# Patient Record
Sex: MALE | Race: WHITE | NOT HISPANIC OR LATINO | Employment: FULL TIME | URBAN - METROPOLITAN AREA
[De-identification: names, ages, dates, MRNs, and addresses within clinical notes are randomized per-mention and may not be internally consistent; named-entity substitution may affect disease eponyms.]

---

## 2017-07-25 ENCOUNTER — ALLSCRIPTS OFFICE VISIT (OUTPATIENT)
Dept: OTHER | Facility: OTHER | Age: 47
End: 2017-07-25

## 2017-07-25 LAB
BILIRUB UR QL STRIP: NEGATIVE
CLARITY UR: NORMAL
COLOR UR: YELLOW
GLUCOSE (HISTORICAL): 500
HGB UR QL STRIP.AUTO: NEGATIVE
KETONES UR STRIP-MCNC: NEGATIVE MG/DL
LEUKOCYTE ESTERASE UR QL STRIP: NEGATIVE
NITRITE UR QL STRIP: NEGATIVE
PH UR STRIP.AUTO: 6.5 [PH]
PROT UR STRIP-MCNC: NORMAL MG/DL
SP GR UR STRIP.AUTO: 1.01
UROBILINOGEN UR QL STRIP.AUTO: 0.2

## 2017-11-25 DIAGNOSIS — N40.0 ENLARGED PROSTATE WITHOUT LOWER URINARY TRACT SYMPTOMS (LUTS): ICD-10-CM

## 2017-11-28 ENCOUNTER — ALLSCRIPTS OFFICE VISIT (OUTPATIENT)
Dept: OTHER | Facility: OTHER | Age: 47
End: 2017-11-28

## 2017-11-28 LAB
BILIRUB UR QL STRIP: NEGATIVE
CLARITY UR: NORMAL
COLOR UR: YELLOW
GLUCOSE (HISTORICAL): NORMAL
HGB UR QL STRIP.AUTO: NEGATIVE
KETONES UR STRIP-MCNC: NEGATIVE MG/DL
LEUKOCYTE ESTERASE UR QL STRIP: NEGATIVE
NITRITE UR QL STRIP: NEGATIVE
PH UR STRIP.AUTO: 6.5 [PH]
PROT UR STRIP-MCNC: NEGATIVE MG/DL
SP GR UR STRIP.AUTO: 1.01
UROBILINOGEN UR QL STRIP.AUTO: 0.2

## 2018-01-12 NOTE — MISCELLANEOUS
Message  Return to work or school:   Neetu Daly is under my professional care   He was seen in my office on 11/28/17   He is able to return to work on  11/30/17            Signatures   Electronically signed by : Vasiliy Seals MD; Nov 28 2017  4:47PM EST

## 2018-01-14 VITALS
BODY MASS INDEX: 36.51 KG/M2 | WEIGHT: 255 LBS | DIASTOLIC BLOOD PRESSURE: 82 MMHG | HEIGHT: 70 IN | SYSTOLIC BLOOD PRESSURE: 136 MMHG

## 2018-01-22 VITALS
DIASTOLIC BLOOD PRESSURE: 84 MMHG | HEIGHT: 70 IN | SYSTOLIC BLOOD PRESSURE: 136 MMHG | WEIGHT: 251 LBS | BODY MASS INDEX: 35.93 KG/M2

## 2018-03-21 ENCOUNTER — TELEPHONE (OUTPATIENT)
Dept: UROLOGY | Facility: MEDICAL CENTER | Age: 48
End: 2018-03-21

## 2018-03-28 ENCOUNTER — DOCUMENTATION (OUTPATIENT)
Dept: UROLOGY | Facility: MEDICAL CENTER | Age: 48
End: 2018-03-28

## 2018-03-28 ENCOUNTER — OFFICE VISIT (OUTPATIENT)
Dept: UROLOGY | Facility: MEDICAL CENTER | Age: 48
End: 2018-03-28
Payer: COMMERCIAL

## 2018-03-28 VITALS
HEIGHT: 71 IN | WEIGHT: 250 LBS | DIASTOLIC BLOOD PRESSURE: 84 MMHG | SYSTOLIC BLOOD PRESSURE: 132 MMHG | BODY MASS INDEX: 35 KG/M2

## 2018-03-28 DIAGNOSIS — E29.1 TESTICULAR HYPOGONADISM: Primary | ICD-10-CM

## 2018-03-28 LAB
SL AMB  POCT GLUCOSE, UA: NORMAL
SL AMB LEUKOCYTE ESTERASE,UA: NEGATIVE
SL AMB POCT BILIRUBIN,UA: NEGATIVE
SL AMB POCT BLOOD,UA: NEGATIVE
SL AMB POCT CLARITY,UA: CLEAR
SL AMB POCT COLOR,UA: YELLOW
SL AMB POCT KETONES,UA: NEGATIVE
SL AMB POCT NITRITE,UA: NEGATIVE
SL AMB POCT PH,UA: 6
SL AMB POCT SPECIFIC GRAVITY,UA: 1.01
SL AMB POCT URINE PROTEIN: NEGATIVE
SL AMB POCT UROBILINOGEN: 0.2

## 2018-03-28 PROCEDURE — S0189 TESTOSTERONE PELLET 75 MG: HCPCS | Performed by: UROLOGY

## 2018-03-28 PROCEDURE — 81003 URINALYSIS AUTO W/O SCOPE: CPT | Performed by: UROLOGY

## 2018-03-28 PROCEDURE — 11980 IMPLANT HORMONE PELLET(S): CPT | Performed by: UROLOGY

## 2018-03-28 RX ORDER — SIMVASTATIN 10 MG
10 TABLET ORAL DAILY
COMMUNITY

## 2018-03-28 RX ORDER — DULAGLUTIDE 0.75 MG/.5ML
INJECTION, SOLUTION SUBCUTANEOUS WEEKLY
COMMUNITY
Start: 2018-03-06 | End: 2022-01-26 | Stop reason: ALTCHOICE

## 2018-03-28 RX ORDER — SILDENAFIL 100 MG/1
TABLET, FILM COATED ORAL AS NEEDED
COMMUNITY

## 2018-03-28 NOTE — PROGRESS NOTES
Procedures       Preoperative diagnosis:  Testicular hypogonadism    Postoperative diagnosis:  Same    Operation:  Testopel implant    Surgeon:  Asaf Howell MD, if ACS    Anesthesia:  Xylocaine 1%, 12 mL    Procedure: The position was positioned on the procedure table with his left side down  His right buttock was then prepped and draped in sterile fashion  A total of 12 mL of 1% xylocaine was infiltrated at the Ernesto insertion site and along 3 subcutaneous tracts to receive the pellets  A small skin incision was made  The subcutaneous tracts were created in each was filled with 4 pellets  Following implantation, dry sterile dressing was applied  The procedure was completed  The patient tolerated well and was sent home in satisfactory condition    Blood loss was minimal

## 2018-03-28 NOTE — LETTER
March 28, 2018     Teagan Stover MD  [facility]  [address]  [facility] [place] [facility] [address]    Patient: Albert Zamora   YOB: 1970   Date of Visit: 3/28/2018       Dear Dr Kirill Pena: Thank you for referring Albert Zamora to me for evaluation  Below are my notes for this consultation  If you have questions, please do not hesitate to call me  I look forward to following your patient along with you  Sincerely,        Teagan Stover MD        CC: DO Teagan Wong MD  3/28/2018 10:04 AM  Sign at close encounter  Procedures       Preoperative diagnosis:  Testicular hypogonadism    Postoperative diagnosis:  Same    Operation:  Testopel implant    Surgeon:  Rylie Crocker MD, if ACS    Anesthesia:  Xylocaine 1%, 12 mL    Procedure: The position was positioned on the procedure table with his left side down  His right buttock was then prepped and draped in sterile fashion  A total of 12 mL of 1% xylocaine was infiltrated at the Ernesto insertion site and along 3 subcutaneous tracts to receive the pellets  A small skin incision was made  The subcutaneous tracts were created in each was filled with 4 pellets  Following implantation, dry sterile dressing was applied  The procedure was completed  The patient tolerated well and was sent home in satisfactory condition    Blood loss was minimal

## 2018-06-22 ENCOUNTER — TELEPHONE (OUTPATIENT)
Dept: UROLOGY | Facility: MEDICAL CENTER | Age: 48
End: 2018-06-22

## 2018-06-22 NOTE — TELEPHONE ENCOUNTER
7/30/2018 Testopel insertion with Dr Connor Avelar      -covered benefit 100% once deductible is    Met    -$221 00 remaining deductible     -[facility] delivered Testopel to office 7/27/18      -Per Clear coverage: No Auth needed

## 2018-07-30 ENCOUNTER — PROCEDURE VISIT (OUTPATIENT)
Dept: UROLOGY | Facility: MEDICAL CENTER | Age: 48
End: 2018-07-30
Payer: COMMERCIAL

## 2018-07-30 VITALS
DIASTOLIC BLOOD PRESSURE: 82 MMHG | HEIGHT: 71 IN | BODY MASS INDEX: 35.28 KG/M2 | WEIGHT: 252 LBS | SYSTOLIC BLOOD PRESSURE: 134 MMHG

## 2018-07-30 DIAGNOSIS — R35.1 NOCTURIA: ICD-10-CM

## 2018-07-30 DIAGNOSIS — E29.1 TESTICULAR HYPOFUNCTION: Primary | ICD-10-CM

## 2018-07-30 LAB
SL AMB  POCT GLUCOSE, UA: NORMAL
SL AMB LEUKOCYTE ESTERASE,UA: NEGATIVE
SL AMB POCT BILIRUBIN,UA: NEGATIVE
SL AMB POCT BLOOD,UA: NEGATIVE
SL AMB POCT CLARITY,UA: CLEAR
SL AMB POCT COLOR,UA: YELLOW
SL AMB POCT KETONES,UA: NEGATIVE
SL AMB POCT NITRITE,UA: NEGATIVE
SL AMB POCT PH,UA: 6.5
SL AMB POCT SPECIFIC GRAVITY,UA: 1.01
SL AMB POCT URINE PROTEIN: NEGATIVE
SL AMB POCT UROBILINOGEN: 0.2

## 2018-07-30 PROCEDURE — 81003 URINALYSIS AUTO W/O SCOPE: CPT | Performed by: UROLOGY

## 2018-07-30 PROCEDURE — 11980 IMPLANT HORMONE PELLET(S): CPT | Performed by: UROLOGY

## 2018-07-30 NOTE — PROGRESS NOTES
IPSS Questionnaire (AUA-7): Over the past month    1)  How often have you had a sensation of not emptying your bladder completely after you finish urinating? 0 - Not at all   2)  How often have you had to urinate again less than two hours after you finished urinating? 1 - Less than 1 time in 5   3)  How often have you found you stopped and started again several times when you urinated? 0 - Not at all   4) How difficult have you found it to postpone urination? 1 - Less than 1 time in 5   5) How often have you had a weak urinary stream?  0 - Not at all   6) How often have you had to push or strain to begin urination? 0 - Not at all   7) How many times did you most typically get up to urinate from the time you went to bed until the time you got up in the morning?  0 - None   Total Score:  2     QOL: Delighted

## 2018-07-30 NOTE — PROGRESS NOTES
Procedures      Preoperative diagnosis:  Testicular hypogonadism    Postoperative diagnosis: Same    Operation:  Testopel implant    Surgeon:  Waqas Alvarado MD, if ACS    Anesthesia:  Xylocaine 1%, 12 mL    Procedure: The position was positioned on the procedure table with his right side down  His left buttock was then prepped and draped in sterile fashion  A total of 12 mL of 1% xylocaine was infiltrated at the Ernesto insertion site and along 3 subcutaneous tracts to receive the pellets  A small skin incision was made  The subcutaneous tracts were created in each was filled with 12 pellets  Following implantation, a dry sterile dressing was applied  The procedure was completed  The patient tolerated well and was sent home in satisfactory condition    Blood loss was minimal

## 2018-10-29 ENCOUNTER — TELEPHONE (OUTPATIENT)
Dept: UROLOGY | Facility: MEDICAL CENTER | Age: 48
End: 2018-10-29

## 2018-10-29 NOTE — TELEPHONE ENCOUNTER
-covered benefit 100% once deductible is Met     -Deductible met for 2018     -[facility] order form faxed 10/29/18              Pellets to be delivered to the office 11/29/18 from  [facility]     -Per Clear coverage: No Auth needed

## 2018-12-04 ENCOUNTER — PROCEDURE VISIT (OUTPATIENT)
Dept: UROLOGY | Facility: MEDICAL CENTER | Age: 48
End: 2018-12-04
Payer: COMMERCIAL

## 2018-12-04 VITALS
SYSTOLIC BLOOD PRESSURE: 120 MMHG | HEIGHT: 70 IN | DIASTOLIC BLOOD PRESSURE: 84 MMHG | HEART RATE: 84 BPM | WEIGHT: 250 LBS | BODY MASS INDEX: 35.79 KG/M2

## 2018-12-04 DIAGNOSIS — E29.1 TESTICULAR HYPOFUNCTION: Primary | ICD-10-CM

## 2018-12-04 PROCEDURE — 11980 IMPLANT HORMONE PELLET(S): CPT | Performed by: UROLOGY

## 2018-12-04 PROCEDURE — S0189 TESTOSTERONE PELLET 75 MG: HCPCS

## 2018-12-04 NOTE — PROGRESS NOTES
Procedures      Preoperative diagnosis:  Testicular hypogonadism    Postoperative diagnosis: Same    Operation:  Testopel implant    Surgeon:  Rea Boone MD, if ACS    Anesthesia:  Xylocaine 1%  With epinephrine, 12 mL    Procedure: The position was positioned on the procedure table with his  left side down  His  right buttock was then prepped and draped in sterile fashion  A total of 12 mL of 1% xylocaine was infiltrated at the pellet insertion site and along  3 subcutaneous tracts to receive the pellets  A small skin incision was made  The subcutaneous tracts were created in each was filled with  4 pellets  Following implantation, a dry sterile dressing was applied  The procedure was completed  The patient tolerated well and was sent home in satisfactory condition  Blood loss was minimal     Testosterone levels on October 27, 2018 when the middle of the normal range  PSA is 0 6 which is normal for man this age  AUA SYMPTOM SCORE      Most Recent Value   AUA SYMPTOM SCORE   How often have you had a sensation of not emptying your bladder completely after you finished urinating? 0   How often have you had to urinate again less than two hours after you finished urinating? 1   How often have you found you stopped and started again several times when you urinate?  0   How often have you found it difficult to postpone urination? 2   How often have you had a weak urinary stream?  0   How often have you had to push or strain to begin urination? 0   How many times did you most typically get up to urinate from the time you went to bed at night until the time you got up in the morning?   1   Quality of Life: If you were to spend the rest of your life with your urinary condition just the way it is now, how would you feel about that?  1   AUA SYMPTOM SCORE  4

## 2018-12-04 NOTE — LETTER
December 4, 2018     Jevon Montesmaciel, [address] [facility] [address]    Patient: Lashay Cote   YOB: 1970   Date of Visit: 12/4/2018       Dear Dr Crescencio Yoon:    Thank you for referring Lashay Cote to me for evaluation  Below are my notes for this consultation  If you have questions, please do not hesitate to call me  I look forward to following your patient along with you  Sincerely,        Genoveva Staton MD        CC: No Recipients  Genoveva Staton MD  12/4/2018  9:42 AM  Sign at close encounter  Procedures      Preoperative diagnosis:  Testicular hypogonadism    Postoperative diagnosis: Same    Operation:  Testopel implant    Surgeon:  Rea Boone MD, if ACS    Anesthesia:  Xylocaine 1%  With epinephrine, 12 mL    Procedure: The position was positioned on the procedure table with his  left side down  His  right buttock was then prepped and draped in sterile fashion  A total of 12 mL of 1% xylocaine was infiltrated at the pellet insertion site and along  3 subcutaneous tracts to receive the pellets  A small skin incision was made  The subcutaneous tracts were created in each was filled with  4 pellets  Following implantation, a dry sterile dressing was applied  The procedure was completed  The patient tolerated well and was sent home in satisfactory condition  Blood loss was minimal     Testosterone levels on October 27, 2018 when the middle of the normal range  PSA is 0 6 which is normal for man this age  AUA SYMPTOM SCORE      Most Recent Value   AUA SYMPTOM SCORE   How often have you had a sensation of not emptying your bladder completely after you finished urinating? 0   How often have you had to urinate again less than two hours after you finished urinating? 1   How often have you found you stopped and started again several times when you urinate?  0   How often have you found it difficult to postpone urination?   2   How often have you had a weak urinary stream?  0   How often have you had to push or strain to begin urination? 0   How many times did you most typically get up to urinate from the time you went to bed at night until the time you got up in the morning?   1   Quality of Life: If you were to spend the rest of your life with your urinary condition just the way it is now, how would you feel about that?  1   AUA SYMPTOM SCORE  4

## 2019-02-08 ENCOUNTER — TELEPHONE (OUTPATIENT)
Dept: UROLOGY | Facility: MEDICAL CENTER | Age: 49
End: 2019-02-08

## 2019-04-09 ENCOUNTER — PROCEDURE VISIT (OUTPATIENT)
Dept: UROLOGY | Facility: MEDICAL CENTER | Age: 49
End: 2019-04-09
Payer: COMMERCIAL

## 2019-04-09 VITALS
DIASTOLIC BLOOD PRESSURE: 84 MMHG | WEIGHT: 245 LBS | HEIGHT: 70 IN | HEART RATE: 93 BPM | SYSTOLIC BLOOD PRESSURE: 142 MMHG | BODY MASS INDEX: 35.07 KG/M2

## 2019-04-09 DIAGNOSIS — E29.1 TESTICULAR HYPOFUNCTION: Primary | ICD-10-CM

## 2019-04-09 PROCEDURE — 81003 URINALYSIS AUTO W/O SCOPE: CPT | Performed by: UROLOGY

## 2019-04-09 PROCEDURE — 11980 IMPLANT HORMONE PELLET(S): CPT | Performed by: UROLOGY

## 2019-04-09 RX ORDER — PREDNISONE 20 MG/1
TABLET ORAL
COMMUNITY
End: 2019-04-09

## 2019-04-09 RX ORDER — FLUNISOLIDE 0.25 MG/ML
SOLUTION NASAL
COMMUNITY
End: 2019-04-09

## 2019-04-09 RX ORDER — PREDNISOLONE ACETATE 10 MG/ML
SUSPENSION/ DROPS OPHTHALMIC
COMMUNITY
End: 2019-04-09

## 2019-04-09 RX ORDER — OXYCODONE HYDROCHLORIDE AND ACETAMINOPHEN 5; 325 MG/1; MG/1
TABLET ORAL
COMMUNITY
End: 2019-04-09

## 2019-04-09 RX ORDER — NABUMETONE 750 MG/1
TABLET, FILM COATED ORAL
COMMUNITY
End: 2019-04-09

## 2019-04-09 RX ORDER — AMOXICILLIN AND CLAVULANATE POTASSIUM 875; 125 MG/1; MG/1
TABLET, FILM COATED ORAL
COMMUNITY
End: 2019-04-09

## 2019-04-09 RX ORDER — CIPROFLOXACIN HYDROCHLORIDE 3.5 MG/ML
SOLUTION/ DROPS TOPICAL
COMMUNITY
End: 2019-04-09

## 2019-04-09 RX ORDER — KETOROLAC TROMETHAMINE 5 MG/ML
SOLUTION OPHTHALMIC
COMMUNITY
End: 2019-04-09

## 2019-08-01 ENCOUNTER — TELEPHONE (OUTPATIENT)
Dept: UROLOGY | Facility: AMBULATORY SURGERY CENTER | Age: 49
End: 2019-08-01

## 2019-08-02 ENCOUNTER — TELEPHONE (OUTPATIENT)
Dept: UROLOGY | Facility: MEDICAL CENTER | Age: 49
End: 2019-08-02

## 2019-08-02 NOTE — TELEPHONE ENCOUNTER
Vianney from [facility] called to see if the patient is taking the testosterone on its own or with another androgen

## 2019-08-02 NOTE — TELEPHONE ENCOUNTER
Rubi Rushing from [address] [place] [address] called to schedule a delivery of this patient's Testopel  Return the call to [phone]

## 2019-08-13 ENCOUNTER — PROCEDURE VISIT (OUTPATIENT)
Dept: UROLOGY | Facility: MEDICAL CENTER | Age: 49
End: 2019-08-13
Payer: COMMERCIAL

## 2019-08-13 VITALS
HEART RATE: 88 BPM | DIASTOLIC BLOOD PRESSURE: 82 MMHG | BODY MASS INDEX: 33.6 KG/M2 | SYSTOLIC BLOOD PRESSURE: 130 MMHG | HEIGHT: 71 IN | WEIGHT: 240 LBS

## 2019-08-13 DIAGNOSIS — E29.1 TESTICULAR HYPOFUNCTION: Primary | ICD-10-CM

## 2019-08-13 DIAGNOSIS — R35.1 NOCTURIA: ICD-10-CM

## 2019-08-13 PROCEDURE — 11980 IMPLANT HORMONE PELLET(S): CPT | Performed by: UROLOGY

## 2019-08-13 NOTE — PROGRESS NOTES
Procedures  Preoperative diagnosis:  Testicular hypogonadism    Postoperative diagnosis: Same    Operation:  Testopel implant    Surgeon:  Castro Jamison MD    Anesthesia:  Xylocaine 1%, 12 mL    Procedure: The patient was positioned on the procedure table with his left side down  His right buttock was then prepped and draped in sterile fashion  A total of 12 mL of 1% xylocaine was infiltrated at the pellet insertion site and along 3 subcutaneous tracts to receive the pellets  A small skin incision was made  The subcutaneous tracts were created in each was filled with 4 pellets  Following implantation, a dry sterile dressing was applied  The procedure was completed  The patient tolerated well and was sent home in satisfactory condition    Blood loss was minimal

## 2019-11-05 ENCOUNTER — TELEPHONE (OUTPATIENT)
Dept: UROLOGY | Facility: MEDICAL CENTER | Age: 49
End: 2019-11-05

## 2019-11-20 NOTE — TELEPHONE ENCOUNTER
No updates from [place] as of today's date  Called for update, spoke to [facility], he states rx is still in insurance verification  I advised we need pellets by 12/1  He states he will have this expedited and contact me as soon as he can schedule delivery

## 2019-11-25 NOTE — TELEPHONE ENCOUNTER
No updates from [facility] as of today's date  Called, spoke to Edwardo Castillo  He states medication is verified by insurance and is ready to be shipped  Scheduled delivery 11/27

## 2019-12-10 ENCOUNTER — PROCEDURE VISIT (OUTPATIENT)
Dept: UROLOGY | Facility: MEDICAL CENTER | Age: 49
End: 2019-12-10
Payer: COMMERCIAL

## 2019-12-10 VITALS
SYSTOLIC BLOOD PRESSURE: 124 MMHG | WEIGHT: 240 LBS | DIASTOLIC BLOOD PRESSURE: 72 MMHG | HEART RATE: 89 BPM | BODY MASS INDEX: 33.6 KG/M2 | HEIGHT: 71 IN

## 2019-12-10 DIAGNOSIS — R35.1 NOCTURIA: ICD-10-CM

## 2019-12-10 DIAGNOSIS — E29.1 TESTICULAR HYPOFUNCTION: Primary | ICD-10-CM

## 2019-12-10 PROCEDURE — 11980 IMPLANT HORMONE PELLET(S): CPT | Performed by: UROLOGY

## 2019-12-10 PROCEDURE — S0189 TESTOSTERONE PELLET 75 MG: HCPCS | Performed by: UROLOGY

## 2019-12-10 NOTE — PROGRESS NOTES
Procedures  Preoperative diagnosis:  Testicular hypogonadism    Postoperative diagnosis: Same    Operation:  Testopel implant    Surgeon:  Mihaela Jessica MD    Anesthesia:  Xylocaine 1%, 12 mL    Procedure: The patient was positioned on the procedure table with his right side down  His left buttock was then prepped and draped in sterile fashion  A total of 12 mL of 1% xylocaine was infiltrated at the pellet insertion site and along 3 subcutaneous tracts to receive the pellets  A small skin incision was made  The subcutaneous tracts were created in each was filled with 4 pellets  Following implantation, a dry sterile dressing was applied  The procedure was completed  The patient tolerated well and was sent home in satisfactory condition  Blood loss was minimal     Testosterone levels have been ordered prior to the next implantation  I have also ordered a PSA since the patient will be over 48years old at that visit

## 2020-03-09 ENCOUNTER — TELEPHONE (OUTPATIENT)
Dept: UROLOGY | Facility: MEDICAL CENTER | Age: 50
End: 2020-03-09

## 2020-03-09 NOTE — TELEPHONE ENCOUNTER
Patient of Dr Gale Coto  Patient needs prior authorization for his testopel treatments due to insurance change  Patient has appointment in April  Patient requesting call back    Patient states he has [address]

## 2020-03-11 NOTE — TELEPHONE ENCOUNTER
Patient has CBC which we have a prior auth on file for Testopel which expires in 7/2020, he can be buy and bill  He was wondering regarding this because he received something in the mail about ordering through [facility] which he has in the past  I did explain to him he can order through there again or he can get it at our office  He is ok with our office supplying this medication    Thanks  Daisy Blankenship

## 2020-05-05 ENCOUNTER — TELEPHONE (OUTPATIENT)
Dept: UROLOGY | Facility: MEDICAL CENTER | Age: 50
End: 2020-05-05

## 2020-05-12 ENCOUNTER — PROCEDURE VISIT (OUTPATIENT)
Dept: UROLOGY | Facility: MEDICAL CENTER | Age: 50
End: 2020-05-12
Payer: COMMERCIAL

## 2020-05-12 VITALS
SYSTOLIC BLOOD PRESSURE: 128 MMHG | WEIGHT: 240 LBS | HEIGHT: 71 IN | DIASTOLIC BLOOD PRESSURE: 72 MMHG | BODY MASS INDEX: 33.6 KG/M2

## 2020-05-12 DIAGNOSIS — E29.1 TESTICULAR HYPOFUNCTION: Primary | ICD-10-CM

## 2020-05-12 PROCEDURE — S0189 TESTOSTERONE PELLET 75 MG: HCPCS | Performed by: UROLOGY

## 2020-05-12 PROCEDURE — 11980 IMPLANT HORMONE PELLET(S): CPT | Performed by: UROLOGY

## 2020-08-12 ENCOUNTER — TELEPHONE (OUTPATIENT)
Dept: UROLOGY | Facility: MEDICAL CENTER | Age: 50
End: 2020-08-12

## 2020-08-27 NOTE — TELEPHONE ENCOUNTER
Testopel received in office 20  Medication packed and labeled with pt's name and , and placed in 101B cabinet for 20 appt

## 2020-09-22 ENCOUNTER — PROCEDURE VISIT (OUTPATIENT)
Dept: UROLOGY | Facility: MEDICAL CENTER | Age: 50
End: 2020-09-22
Payer: COMMERCIAL

## 2020-09-22 ENCOUNTER — TELEPHONE (OUTPATIENT)
Dept: UROLOGY | Facility: MEDICAL CENTER | Age: 50
End: 2020-09-22

## 2020-09-22 VITALS
SYSTOLIC BLOOD PRESSURE: 126 MMHG | TEMPERATURE: 97.6 F | HEIGHT: 71 IN | DIASTOLIC BLOOD PRESSURE: 72 MMHG | BODY MASS INDEX: 33.47 KG/M2

## 2020-09-22 DIAGNOSIS — R35.1 NOCTURIA: ICD-10-CM

## 2020-09-22 DIAGNOSIS — Z12.5 SCREENING FOR PROSTATE CANCER: ICD-10-CM

## 2020-09-22 DIAGNOSIS — E29.1 TESTICULAR HYPOFUNCTION: Primary | ICD-10-CM

## 2020-09-22 PROCEDURE — 11980 IMPLANT HORMONE PELLET(S): CPT | Performed by: UROLOGY

## 2020-09-22 PROCEDURE — S0189 TESTOSTERONE PELLET 75 MG: HCPCS | Performed by: UROLOGY

## 2020-09-22 NOTE — PROGRESS NOTES
Procedures  Preoperative diagnosis:  Testicular hypogonadism    Postoperative diagnosis: Same    Operation:  Testopel implant    Surgeon:  Navi Marquez MD    Anesthesia:  Xylocaine 1% with epinephrine, 12 mL    Procedure: The patient was positioned on the procedure table in the supine position  His right thigh was then prepped and draped in sterile fashion  A total of 12 mL of 1% xylocaine was infiltrated at the pellet insertion site and along 3 subcutaneous tracts to receive the pellets  A small skin incision was made  The subcutaneous tracts were created in each was filled with 4 pellets  Following implantation, a dry sterile dressing was applied  The procedure was completed  The patient tolerated well and was sent home in satisfactory condition  Blood loss was minimal     Because the patient has never been implanted in his thigh before, we will check a testosterone panel in 3 months to assess drug absorption and duration

## 2020-12-19 LAB — HBA1C MFR BLD HPLC: 6.9 %

## 2020-12-23 ENCOUNTER — TELEPHONE (OUTPATIENT)
Dept: UROLOGY | Facility: MEDICAL CENTER | Age: 50
End: 2020-12-23

## 2021-01-12 ENCOUNTER — TELEPHONE (OUTPATIENT)
Dept: UROLOGY | Facility: MEDICAL CENTER | Age: 51
End: 2021-01-12

## 2021-01-12 NOTE — TELEPHONE ENCOUNTER
Testopel packed and labeled with pt's name and , and placed in 101B cabinet for 21 appt with Dr Kenroy Daniels

## 2021-01-26 ENCOUNTER — PROCEDURE VISIT (OUTPATIENT)
Dept: UROLOGY | Facility: MEDICAL CENTER | Age: 51
End: 2021-01-26
Payer: COMMERCIAL

## 2021-01-26 VITALS — BODY MASS INDEX: 32.76 KG/M2 | WEIGHT: 234 LBS | HEIGHT: 71 IN

## 2021-01-26 DIAGNOSIS — E29.1 TESTICULAR HYPOFUNCTION: Primary | ICD-10-CM

## 2021-01-26 PROCEDURE — S0189 TESTOSTERONE PELLET 75 MG: HCPCS | Performed by: UROLOGY

## 2021-01-26 PROCEDURE — 11980 IMPLANT HORMONE PELLET(S): CPT | Performed by: UROLOGY

## 2021-01-26 RX ORDER — VARENICLINE TARTRATE
KIT
COMMUNITY
Start: 2021-01-04 | End: 2022-01-26 | Stop reason: ALTCHOICE

## 2021-01-26 NOTE — PROGRESS NOTES
The patient's most recent Testopel insertion was in his anterior thigh because of difficulty implanting his buttocks due to scarring from previous procedures  The patient presented today with photographs of anterior thigh bruising with some drainage of blood down medially  He also noted some muscle soreness from bruising  For this reason, we will resume implanting in the buttock  Preoperative diagnosis:  Testicular hypogonadism    Postoperative diagnosis: Same    Operation:  Testopel implant    Surgeon:  Prabhu Kelley MD    Anesthesia:  Xylocaine 1%, 12 mL    Procedure: The patient was positioned on the procedure table with his right side down  His left buttock was then prepped and draped in sterile fashion  A total of 12 mL of 1% xylocaine was infiltrated at the pellet insertion site and along 3 subcutaneous tracts to receive the pellets  A small skin incision was made  The subcutaneous tracts were created in each was filled with 4 pellets  Following implantation, a dry sterile dressing was applied  The procedure was completed  The patient tolerated well and was sent home in satisfactory condition  Blood loss was minimal   The patient will return in 4 months for his next Testopel insertion

## 2021-02-24 ENCOUNTER — EVALUATION (OUTPATIENT)
Dept: PHYSICAL THERAPY | Facility: CLINIC | Age: 51
End: 2021-02-24
Payer: COMMERCIAL

## 2021-02-24 ENCOUNTER — TRANSCRIBE ORDERS (OUTPATIENT)
Dept: PHYSICAL THERAPY | Facility: CLINIC | Age: 51
End: 2021-02-24

## 2021-02-24 DIAGNOSIS — M25.511 ACUTE PAIN OF RIGHT SHOULDER: Primary | ICD-10-CM

## 2021-02-24 PROCEDURE — 97110 THERAPEUTIC EXERCISES: CPT | Performed by: PHYSICAL THERAPIST

## 2021-02-24 PROCEDURE — 97161 PT EVAL LOW COMPLEX 20 MIN: CPT | Performed by: PHYSICAL THERAPIST

## 2021-02-24 NOTE — PROGRESS NOTES
PT Evaluation     Today's date: 2021  Patient name: Ashley Jarvis  : 1970  MRN: [id number]  Referring provider: Kimberley Sims DO  Dx:   Encounter Diagnosis     ICD-10-CM    1  Acute pain of right shoulder  M25 511                   Assessment  Assessment details: Ashley Jarvis is a 46 y o  male who presents with R shoulder pain  Pt has decreased R UE strength and ROM as well as pain with function  Pt currently has difficulty reaching behind back, interrupted sleep, difficulty getting out of work equipment, difficulty dressing  Pt would benefit from skilled PT to address the above impairments and to return to pain free function  Impairments: abnormal or restricted ROM, impaired physical strength, lacks appropriate home exercise program and pain with function  Functional limitations: difficulty reaching behind back, interrupted sleep, difficulty getting out of work equipment, difficulty dressing  Symptom irritability: moderateUnderstanding of Dx/Px/POC: good   Prognosis: good    Goals  1  Pt will be independent with HEP upon DC  2  Pt's R UE ROM will increase by at least 25% to allow for dressing and reaching with ease  3  Pt's R UE strength will be 5/5 t/o and pain free to allow for completing duties at work  4  Pt's pain will be no more than 2/10 to allow for uninterrupted sleep  Plan  Patient would benefit from: skilled physical therapy  Planned modality interventions: low level laser therapy  Planned therapy interventions: joint mobilization, manual therapy, neuromuscular re-education, therapeutic activities and therapeutic exercise  Frequency: 2x week  Duration in visits: 12  Duration in weeks: 6  Treatment plan discussed with: patient        Subjective Evaluation    History of Present Illness  Date of onset: 2020  Mechanism of injury: Pt reports an insidious onset of R shoulder pain beginning in November  Pt reports that pain has been getting progressively worse   X-rays of shoulder were unremarkable  Pt denies imaging or injections  Pt presents to OP PT  Not a recurrent problem   Quality of life: good    Pain  Current pain ratin  At best pain ratin  At worst pain ratin  Location: R shoulder  Quality: dull ache and sharp  Progression: worsening    Hand dominance: right      Diagnostic Tests  X-ray: normal  Treatments  No previous or current treatments  Patient Goals  Patient goals for therapy: decreased pain          Objective     Active Range of Motion     Right Shoulder   Flexion: 140 degrees   Abduction: 155 degrees   External rotation 90°: 40 degrees  Internal rotation 90°: 30 degrees     Passive Range of Motion     Right Shoulder   Flexion: 155 degrees   Abduction: 115 degrees   External rotation 90°: 50 degrees   Internal rotation 90°: 40 degrees     Strength/Myotome Testing     Right Shoulder     Planes of Motion   Flexion: 4   Abduction: 4   External rotation at 90°: 4-   Internal rotation at 90°: 5     Tests     Right Shoulder   Positive Hawkin's and Neer's  Negative empty can and full can                Precautions: none      Manuals             PROM to rebecca                                                    Neuro Re-Ed             Sarah Gonzalez ext/IR             Posture tband             Prone flex/h ab/ext                                                                 Ther Ex             UBE (f/b)             Wall slides (flex/ab) 10x10" HEP           Pulleys (flex/ab)             Sleeper stretch 3x30"            Supine cane (flex/ab/ER)             Supine flexion             SL serratus punch             Push up plus on wall                                                                              Ther Activity

## 2021-02-24 NOTE — LETTER
2021    Hugo Espinal DO  [address] [address] [facility]    Patient: Shellie Casanova   YOB: 1970   Date of Visit: 2021     Encounter Diagnosis     ICD-10-CM    1  Acute pain of right shoulder  M25 511 CANCELED: PT plan of care cert/re-cert       Dear Dr Chadd Harris:    Thank you for your recent referral of Shellie Casanova  Please review the attached evaluation summary from Fran's recent visit  Please verify that you agree with the plan of care by signing the attached order  If you have any questions or concerns, please do not hesitate to call  I sincerely appreciate the opportunity to share in the care of one of your patients and hope to have another opportunity to work with you in the near future  Sincerely,    Aminta Sanhcez, PT      Referring Provider:      I certify that I have read the below Plan of Care and certify the need for these services furnished under this plan of treatment while under my care  Hugo Espinal DO  [address] [place] [address]  Via Fax: [phone]          PT Evaluation     Today's date: 2021  Patient name: Shellie Casanova  : 1970  MRN: [id number]  Referring provider: Roslyn Dickerson DO  Dx:   Encounter Diagnosis     ICD-10-CM    1  Acute pain of right shoulder  M25 511                   Assessment  Assessment details: Shellie Casanova is a 46 y o  male who presents with R shoulder pain  Pt has decreased R UE strength and ROM as well as pain with function  Pt currently has difficulty reaching behind back, interrupted sleep, difficulty getting out of work equipment, difficulty dressing  Pt would benefit from skilled PT to address the above impairments and to return to pain free function        Impairments: abnormal or restricted ROM, impaired physical strength, lacks appropriate home exercise program and pain with function  Functional limitations: difficulty reaching behind back, interrupted sleep, difficulty getting out of work equipment, difficulty dressing  Symptom irritability: moderateUnderstanding of Dx/Px/POC: good   Prognosis: good    Goals  1  Pt will be independent with HEP upon DC  2  Pt's R UE ROM will increase by at least 25% to allow for dressing and reaching with ease  3  Pt's R UE strength will be 5/5 t/o and pain free to allow for completing duties at work  4  Pt's pain will be no more than 2/10 to allow for uninterrupted sleep  Plan  Patient would benefit from: skilled physical therapy  Planned modality interventions: low level laser therapy  Planned therapy interventions: joint mobilization, manual therapy, neuromuscular re-education, therapeutic activities and therapeutic exercise  Frequency: 2x week  Duration in visits: 12  Duration in weeks: 6  Treatment plan discussed with: patient        Subjective Evaluation    History of Present Illness  Date of onset: 2020  Mechanism of injury: Pt reports an insidious onset of R shoulder pain beginning in November  Pt reports that pain has been getting progressively worse  X-rays of shoulder were unremarkable  Pt denies imaging or injections  Pt presents to OP PT            Not a recurrent problem   Quality of life: good    Pain  Current pain ratin  At best pain ratin  At worst pain ratin  Location: R shoulder  Quality: dull ache and sharp  Progression: worsening    Hand dominance: right      Diagnostic Tests  X-ray: normal  Treatments  No previous or current treatments  Patient Goals  Patient goals for therapy: decreased pain          Objective     Active Range of Motion     Right Shoulder   Flexion: 140 degrees   Abduction: 155 degrees   External rotation 90°: 40 degrees  Internal rotation 90°: 30 degrees     Passive Range of Motion     Right Shoulder   Flexion: 155 degrees   Abduction: 115 degrees   External rotation 90°: 50 degrees   Internal rotation 90°: 40 degrees     Strength/Myotome Testing     Right Shoulder Planes of Motion   Flexion: 4   Abduction: 4   External rotation at 90°: 4-   Internal rotation at 90°: 5     Tests     Right Shoulder   Positive Hawkin's and Neer's  Negative empty can and full can                Precautions: none      Manuals 2/24            PROM to rebecca                                                    Neuro Re-Ed             Bryan Dela Cruz ext/IR             Posture tband             Prone flex/h ab/ext                                                                 Ther Ex             UBE (f/b)             Wall slides (flex/ab) 10x10" HEP           Pulleys (flex/ab)             Sleeper stretch 3x30"            Supine cane (flex/ab/ER)             Supine flexion             SL serratus punch             Push up plus on wall                                                                              Ther Activity

## 2021-03-01 ENCOUNTER — APPOINTMENT (OUTPATIENT)
Dept: PHYSICAL THERAPY | Facility: CLINIC | Age: 51
End: 2021-03-01
Payer: COMMERCIAL

## 2021-03-03 ENCOUNTER — OFFICE VISIT (OUTPATIENT)
Dept: PHYSICAL THERAPY | Facility: CLINIC | Age: 51
End: 2021-03-03
Payer: COMMERCIAL

## 2021-03-03 DIAGNOSIS — M25.511 ACUTE PAIN OF RIGHT SHOULDER: Primary | ICD-10-CM

## 2021-03-03 PROCEDURE — 97112 NEUROMUSCULAR REEDUCATION: CPT | Performed by: PHYSICAL THERAPIST

## 2021-03-03 PROCEDURE — 97110 THERAPEUTIC EXERCISES: CPT | Performed by: PHYSICAL THERAPIST

## 2021-03-03 PROCEDURE — 97140 MANUAL THERAPY 1/> REGIONS: CPT | Performed by: PHYSICAL THERAPIST

## 2021-03-03 NOTE — PROGRESS NOTES
Daily Note     Today's date: 3/3/2021  Patient name: Linda Mi  : 1970  MRN: [id number]  Referring provider: Raymundo Judge DO  Dx:   Encounter Diagnosis     ICD-10-CM    1  Acute pain of right shoulder  M25 511                   Subjective: "I feel the same "      Objective: See treatment diary below      Assessment: Pt had good tolerance to initiation of tx  Pt unable to demonstrate HEP, so reviewed  Will continue to monitor  Plan: Continue per plan of care        Precautions: none      Manuals 2/24 3/3           PROM to rebecca  8'                                                  Neuro Re-Ed             [place] ext/IR  10x10" ext           Posture tband  GTB x15ea           Prone flex/h ab/ext                                                                 Ther Ex             UBE (f/b)  3'/3'           Wall slides (flex/ab) 10x10" reviewd           Pulleys (flex/ab)  10x10"           Sleeper stretch 3x30" 3x30"           Supine cane (flex/ab/ER)  Flex 10x10"           Supine flexion             SL serratus punch             Push up plus on wall                                                                              Ther Activity

## 2021-03-04 ENCOUNTER — OFFICE VISIT (OUTPATIENT)
Dept: PHYSICAL THERAPY | Facility: CLINIC | Age: 51
End: 2021-03-04
Payer: COMMERCIAL

## 2021-03-04 DIAGNOSIS — M25.511 ACUTE PAIN OF RIGHT SHOULDER: Primary | ICD-10-CM

## 2021-03-04 PROCEDURE — 97140 MANUAL THERAPY 1/> REGIONS: CPT

## 2021-03-04 PROCEDURE — 97110 THERAPEUTIC EXERCISES: CPT

## 2021-03-04 PROCEDURE — 97112 NEUROMUSCULAR REEDUCATION: CPT

## 2021-03-04 NOTE — PROGRESS NOTES
Daily Note     Today's date: 3/4/2021  Patient name: Mac Serna  : 1970  MRN: [id number]  Referring provider: Abram Black DO  Dx:   Encounter Diagnosis     ICD-10-CM    1  Acute pain of right shoulder  M25 511                   Subjective: Pt states that he has good and bad days  Yesterday he had a good day prior to PT, Today feels a little bit of soreness but not too bad  Objective: See treatment diary below      Assessment: Pt tolerates exercises with no significant change in symptoms  Introduced shoulder IR/ER strengthening with cues provided to keep arm adducted and isolate RTC  Pt with mm  Fatigue noted with these exercises  PT will continue monitoring response  Plan: Continue per plan of care        Precautions: none      Manuals 2/24 3/3 3/4          PROM to rebecca  8' 8'          Joint mob AP   Gr II-III DL                                    Neuro Re-Ed             [facility] ext/IR  10x10" ext 10x10" ext          Posture tband  GTB x15ea GTB x20ea          Prone flex/h ab/ext                                                                 Ther Ex             UBE (f/b)  3'/3' 3'/3'          Wall slides (flex/ab) 10x10" reviewd           Pulleys (flex/ab)  10x10" 10x10"          Sleeper stretch 3x30" 3x30" 3x30"           Supine cane (flex/ab/ER)  Flex 10x10" Flex 10x10"          Supine flexion             SL serratus punch             Push up plus on wall             Shoulder ER   Red 15x          Shoulder IR   Grn 15x                                                 Ther Activity

## 2021-03-08 ENCOUNTER — APPOINTMENT (OUTPATIENT)
Dept: PHYSICAL THERAPY | Facility: CLINIC | Age: 51
End: 2021-03-08
Payer: COMMERCIAL

## 2021-03-10 ENCOUNTER — OFFICE VISIT (OUTPATIENT)
Dept: PHYSICAL THERAPY | Facility: CLINIC | Age: 51
End: 2021-03-10
Payer: COMMERCIAL

## 2021-03-10 DIAGNOSIS — M25.511 ACUTE PAIN OF RIGHT SHOULDER: Primary | ICD-10-CM

## 2021-03-10 PROCEDURE — 97112 NEUROMUSCULAR REEDUCATION: CPT

## 2021-03-10 PROCEDURE — 97140 MANUAL THERAPY 1/> REGIONS: CPT

## 2021-03-10 PROCEDURE — 97110 THERAPEUTIC EXERCISES: CPT

## 2021-03-10 NOTE — PROGRESS NOTES
Daily Note     Today's date: 3/10/2021  Patient name: Mary Ricketts  : 1970  MRN: [id number]  Referring provider: Farooq Friday,   Dx:   Encounter Diagnosis     ICD-10-CM    1  Acute pain of right shoulder  M25 511                   Subjective: "Some days are good, some days are bad, today it's medium "      Objective: See treatment diary below      Assessment: Performed new exercise and remaining exercises w/mild discomfort at times  Able to rebecca manual therapies  Tolerated treatment well  Will monitor  Patient would benefit from continued PT      Plan: Continue per plan of care        Precautions: none      Manuals 2/24 3/3 3/4 3/10         PROM to rebecca  8' 8' 8'         Joint mob AP   Gr II-III DL                                    Neuro Re-Ed             [place]ite ext/IR  10x10" ext 10x10" ext 10x10"         Posture tband  GTB x15ea GTB x20ea GTB  x20ea         Prone flex/h ab/ext                                                                 Ther Ex             UBE (f/b)  3'/3' 3'/3' 3'/3'         Wall slides (flex/ab) 10x10" reviewd           Pulleys (flex/ab)  10x10" 10x10" 10x10"         Sleeper stretch 3x30" 3x30" 3x30"  3x30"         Supine cane (flex/ab/ER)  Flex 10x10" Flex 10x10" Flex  10x10"         Supine flexion    x15         SL serratus punch             Push up plus on wall             Shoulder ER   Red 15x Red  20x         Shoulder IR   Grn 15x Grn  20x                                                Ther Activity

## 2021-03-15 ENCOUNTER — OFFICE VISIT (OUTPATIENT)
Dept: PHYSICAL THERAPY | Facility: CLINIC | Age: 51
End: 2021-03-15
Payer: COMMERCIAL

## 2021-03-15 DIAGNOSIS — M25.511 ACUTE PAIN OF RIGHT SHOULDER: Primary | ICD-10-CM

## 2021-03-15 PROCEDURE — 97112 NEUROMUSCULAR REEDUCATION: CPT | Performed by: PHYSICAL THERAPIST

## 2021-03-15 PROCEDURE — 97110 THERAPEUTIC EXERCISES: CPT | Performed by: PHYSICAL THERAPIST

## 2021-03-15 PROCEDURE — 97140 MANUAL THERAPY 1/> REGIONS: CPT | Performed by: PHYSICAL THERAPIST

## 2021-03-15 NOTE — PROGRESS NOTES
Daily Note     Today's date: 3/15/2021  Patient name: Mac Serna  : 1970  MRN: [id number]  Referring provider: Abram Black DO  Dx:   Encounter Diagnosis     ICD-10-CM    1  Acute pain of right shoulder  M25 511                   Subjective: Patient states he still has pain in the right shoulder  No changes since last visit  He states his pain is worse with reaching behind his back and with sleeping despite the position  Objective: See treatment diary below      Assessment: Patient presents with postural dysfunction, right GHJ hypomobility, and poor scapulothoracic motor control  Patient very guarded during manual interventions, no significant changes in concordant sign post manuals  Patient with cueing throughout active interventions for accurate technique and posturing  Patient will benefit from continued PT to address observed impairments to reduce pain and improve function  Plan: Continue per plan of care  Precautions: none      Manuals 2/24 3/3 3/4 3/10 3/15        PROM to rebecca  8' 8' 8'         Joint mob AP   Gr II-III DL  Gr   III                                  Neuro Re-Ed             Cane ext/IR  10x10" ext 10x10" ext 10x10" 10' x10"        Posture tband  GTB x15ea GTB x20ea GTB  x20ea GTB x20 ea        Prone flex/h ab/ext                                                                 Ther Ex             UBE (f/b)  3'/3' 3'/3' 3'/3' 3'/3'        Wall slides (flex/ab) 10x10" reviewd           Pulleys (flex/ab)  10x10" 10x10" 10x10" 10" x10        Sleeper stretch 3x30" 3x30" 3x30"  3x30"         Supine cane (flex/ab/ER)  Flex 10x10" Flex 10x10" Flex  10x10" Flex 10" x10        Supine flexion    x15 x15        SL serratus punch     x15        Push up plus on wall             Shoulder ER   Red 15x Red  20x Red x20        Shoulder IR   Grn 15x Grn  20x Grn 20x                                               Ther Activity

## 2021-03-17 ENCOUNTER — OFFICE VISIT (OUTPATIENT)
Dept: PHYSICAL THERAPY | Facility: CLINIC | Age: 51
End: 2021-03-17
Payer: COMMERCIAL

## 2021-03-17 DIAGNOSIS — M25.511 ACUTE PAIN OF RIGHT SHOULDER: Primary | ICD-10-CM

## 2021-03-17 PROCEDURE — 97112 NEUROMUSCULAR REEDUCATION: CPT

## 2021-03-17 PROCEDURE — 97110 THERAPEUTIC EXERCISES: CPT

## 2021-03-17 PROCEDURE — 97140 MANUAL THERAPY 1/> REGIONS: CPT

## 2021-03-17 NOTE — PROGRESS NOTES
Daily Note     Today's date: 3/17/2021  Patient name: Antelmo Martinez  : 1970  MRN: [id number]  Referring provider: Jess Wren DO  Dx:   Encounter Diagnosis     ICD-10-CM    1  Acute pain of right shoulder  M25 511                   Subjective: "Good day, bad day sort of thing,' adding today's a good day " Notes he had some pain yesterday when lying prone with his arm down by his side  Objective: See treatment diary below      Assessment: Able to rebecca exercise program as below  Responded well to manual therapies  Tolerated treatment well  Will monitor  Patient would benefit from continued PT      Plan: Continue per plan of care  Precautions: none      Manuals 2/24 3/3 3/4 3/10 3/15 3/17       PROM to rebecca  8' 8' 8'  8'       Joint mob AP   Gr II-III DL  Gr   III Gr III  KL                                 Neuro Re-Ed             Cane ext/IR  10x10" ext 10x10" ext 10x10" 10' x10" 10x10"  ea       Posture tband  GTB x15ea GTB x20ea GTB  x20ea GTB x20 ea GTB  x20ea       Prone flex/h ab/ext                                                                 Ther Ex             UBE (f/b)  3'/3' 3'/3' 3'/3' 3'/3' 3'/3'       Wall slides (flex/ab) 10x10" reviewd           Pulleys (flex/ab)  10x10" 10x10" 10x10" 10" x10 10x10"  ea       Sleeper stretch 3x30" 3x30" 3x30"  3x30"         Supine cane (flex/ab/ER)  Flex 10x10" Flex 10x10" Flex  10x10" Flex 10" x10 Flex  10x10"       Supine flexion    x15 x15 x15       SL serratus punch     x15 x15       Push up plus on wall             Shoulder ER   Red 15x Red  20x Red x20 Red  x20       Shoulder IR   Grn 15x Grn  20x Grn 20x Grn  20x                                              Ther Activity

## 2021-03-22 ENCOUNTER — OFFICE VISIT (OUTPATIENT)
Dept: PHYSICAL THERAPY | Facility: CLINIC | Age: 51
End: 2021-03-22
Payer: COMMERCIAL

## 2021-03-22 DIAGNOSIS — M25.511 ACUTE PAIN OF RIGHT SHOULDER: Primary | ICD-10-CM

## 2021-03-22 PROCEDURE — 97112 NEUROMUSCULAR REEDUCATION: CPT

## 2021-03-22 PROCEDURE — 97140 MANUAL THERAPY 1/> REGIONS: CPT

## 2021-03-22 PROCEDURE — 97110 THERAPEUTIC EXERCISES: CPT

## 2021-03-22 NOTE — PROGRESS NOTES
Daily Note     Today's date: 3/22/2021  Patient name: Cholo Sutton  : 1970  MRN: [id number]  Referring provider: hCaya Sam DO  Dx:   Encounter Diagnosis     ICD-10-CM    1  Acute pain of right shoulder  M25 511                   Subjective: "Still on and off," adding "it felt good when I walked in "      Objective: See treatment diary below      Assessment: Mild discomfort with addition of supine wand ABD and ER  Performed remaining progressions w/o difficulty or discomfort  Tolerated treatment well  Will monitor  Patient would benefit from continued PT      Plan: Continue per plan of care  Precautions: none      Manuals 2/24 3/3 3/4 3/10 3/15 3/17 3/22      PROM to rebecca  8' 8' 8'  8' 8'      Joint mob AP   Gr II-III DL  Gr   III Gr III  KL GR III  KL                                Neuro Re-Ed             Cane ext/IR  10x10" ext 10x10" ext 10x10" 10' x10" 10x10"  ea 10x10"  ea      Posture tband  GTB x15ea GTB x20ea GTB  x20ea GTB x20 ea GTB  x20ea Blue  [id number]      Prone flex/h ab/ext                                                                 Ther Ex             UBE (f/b)  3'/3' 3'/3' 3'/3' 3'/3' 3'/3' 3'/3'      Wall slides (flex/ab) 10x10" reviewd           Pulleys (flex/ab)  10x10" 10x10" 10x10" 10" x10 10x10"  ea 10x10"  ea      Sleeper stretch 3x30" 3x30" 3x30"  3x30"         Supine cane (flex/ab/ER)  Flex 10x10" Flex 10x10" Flex  10x10" Flex 10" x10 Flex  10x10" 10x10"  ea      Supine flexion    x15 x15 x15 x20      SL serratus punch     x15 x15 x20      Push up plus on wall       NV      Shoulder ER   Red 15x Red  20x Red x20 Red  x20 Grn  x15      Shoulder IR   Grn 15x Grn  20x Grn 20x Grn  20x Grn  x20                                             Ther Activity

## 2021-03-24 ENCOUNTER — OFFICE VISIT (OUTPATIENT)
Dept: PHYSICAL THERAPY | Facility: CLINIC | Age: 51
End: 2021-03-24
Payer: COMMERCIAL

## 2021-03-24 DIAGNOSIS — M25.511 ACUTE PAIN OF RIGHT SHOULDER: Primary | ICD-10-CM

## 2021-03-24 PROCEDURE — 97140 MANUAL THERAPY 1/> REGIONS: CPT

## 2021-03-24 PROCEDURE — 97112 NEUROMUSCULAR REEDUCATION: CPT

## 2021-03-24 PROCEDURE — 97110 THERAPEUTIC EXERCISES: CPT

## 2021-03-24 NOTE — PROGRESS NOTES
Daily Note     Today's date: 3/24/2021  Patient name: Raf Morales  : 1970  MRN: [id number]  Referring provider: Paulette Manuel DO  Dx:   Encounter Diagnosis     ICD-10-CM    1  Acute pain of right shoulder  M25 511                   Subjective: "It's starting to get a little better " Notes "I still have good days, and bad days "      Objective: See treatment diary below      Assessment: Mild discomfort w/wall push ups plus exercise  Performed ex progressions w/o complaint  Comfortable throughout manual therapies  Tolerated treatment well  Patient would benefit from continued PT      Plan: Continue per plan of care  Precautions: none      Manuals 2/24 3/3 3/4 3/10 3/15 3/17 3/22 3/24     PROM to rebecca  8' 8' 8'  8' 8' 8'     Joint mob AP   Gr II-III DL  Gr   III Gr III  KL GR III  KL GR III  MD                               Neuro Re-Ed             Cane ext/IR  10x10" ext 10x10" ext 10x10" 10' x10" 10x10"  ea 10x10"  ea 10x10"  ea     Posture tband  GTB x15ea GTB x20ea GTB  x20ea GTB x20 [facility]  [id number] Blue  x20ea     Prone flex/h ab/ext        NV                                                         Ther Ex             UBE (f/b)  3'/3' 3'/3' 3'/3' 3'/3' 3'/3' 3'/3' 3'/3'     Wall slides (flex/ab) 10x10" reviewd           Pulleys (flex/ab)  10x10" 10x10" 10x10" 10" x10 10x10"  ea 10x10"  ea 10x10"  ea     Sleeper stretch 3x30" 3x30" 3x30"  3x30"         Supine cane (flex/ab/ER)  Flex 10x10" Flex 10x10" Flex  10x10" Flex 10" x10 Flex  10x10" 10x10"  ea 10x10"  ea     Supine flexion    x15 x15 x15 x20 x20     SL serratus punch     x15 x15 x20      Push up plus on wall       NV x10     Shoulder ER   Red 15x Red  20x Red x20 Red  x20 Grn  x15 Grn  x20     Shoulder IR   Grn 15x Grn  20x Grn 20x Grn  20x Grn  x20 Blue  x15                                              Ther Activity

## 2021-03-29 ENCOUNTER — OFFICE VISIT (OUTPATIENT)
Dept: PHYSICAL THERAPY | Facility: CLINIC | Age: 51
End: 2021-03-29
Payer: COMMERCIAL

## 2021-03-29 DIAGNOSIS — M25.511 ACUTE PAIN OF RIGHT SHOULDER: Primary | ICD-10-CM

## 2021-03-29 PROCEDURE — 97112 NEUROMUSCULAR REEDUCATION: CPT

## 2021-03-29 PROCEDURE — 97110 THERAPEUTIC EXERCISES: CPT | Performed by: PHYSICAL THERAPIST

## 2021-03-29 PROCEDURE — 97140 MANUAL THERAPY 1/> REGIONS: CPT | Performed by: PHYSICAL THERAPIST

## 2021-03-29 NOTE — PROGRESS NOTES
Daily Note     Today's date: 3/29/2021  Patient name: Ashley Jarvis  : 1970  MRN: [id number]  Referring provider: Kimberley Sims DO  Dx:   Encounter Diagnosis     ICD-10-CM    1  Acute pain of right shoulder  M25 511                   Subjective: "It comes and goes "      Objective: See treatment diary below      Assessment: Able to rebecca new exercises  PROM to R shoulder, mild discomfort end range flex, and ER  Tolerated treatment well  Will monitor  Patient would benefit from continued PT      Plan: Continue per plan of care  Precautions: none      Manuals 2/24 3/3 3/4 3/10 3/15 3/17 3/22 3/24 3/29    PROM to rebecca  8' 8' 8'  8' 8' 8' 8'    Joint mob AP   Gr II-III DL  Gr   III Gr III  KL GR III  KL GR III  MD GR III  MD                              Neuro Re-Ed             Cane ext/IR  10x10" ext 10x10" ext 10x10" 10' x10" 10x10"  ea 10x10"  ea 10x10"  ea 10x10"  ea    Posture tband  GTB x15ea GTB x20ea GTB  x20ea GTB x20 ea GTB  x20ea Blue  x15ea [place]  x20ea Blue  x20ea    Prone flex/h ab/ext        NV x10ea                                                        Ther Ex             UBE (f/b)  3'/3' 3'/3' 3'/3' 3'/3' 3'/3' 3'/3' 3'/3' 3'/3'    Wall slides (flex/ab) 10x10" reviewd           Pulleys (flex/ab)  10x10" 10x10" 10x10" 10" x10 10x10"  ea 10x10"  ea 10x10"  ea 10x10"  ea    Sleeper stretch 3x30" 3x30" 3x30"  3x30"         Supine cane (flex/ab/ER)  Flex 10x10" Flex 10x10" Flex  10x10" Flex 10" x10 Flex  10x10" 10x10"  ea 10x10"  ea 10x10"  ea    Supine flexion    x15 x15 x15 x20 x20 x20    SL serratus punch     x15 x15 x20  x20    Push up plus on wall       NV x10 x10    Shoulder ER   Red 15x Red  20x Red x20 Red  x20 Grn  x15 Grn  x20 held    Shoulder IR   Grn 15x Grn  20x Grn 20x Grn  20x Grn  x20 Blue  x15   Blue  x20                                           Ther Activity

## 2021-03-31 ENCOUNTER — OFFICE VISIT (OUTPATIENT)
Dept: PHYSICAL THERAPY | Facility: CLINIC | Age: 51
End: 2021-03-31
Payer: COMMERCIAL

## 2021-03-31 DIAGNOSIS — M25.511 ACUTE PAIN OF RIGHT SHOULDER: Primary | ICD-10-CM

## 2021-03-31 PROCEDURE — 97112 NEUROMUSCULAR REEDUCATION: CPT

## 2021-03-31 PROCEDURE — 97140 MANUAL THERAPY 1/> REGIONS: CPT

## 2021-03-31 PROCEDURE — 97110 THERAPEUTIC EXERCISES: CPT

## 2021-04-05 ENCOUNTER — OFFICE VISIT (OUTPATIENT)
Dept: PHYSICAL THERAPY | Facility: CLINIC | Age: 51
End: 2021-04-05
Payer: COMMERCIAL

## 2021-04-05 DIAGNOSIS — M25.511 ACUTE PAIN OF RIGHT SHOULDER: Primary | ICD-10-CM

## 2021-04-05 PROCEDURE — 97110 THERAPEUTIC EXERCISES: CPT

## 2021-04-05 PROCEDURE — 97112 NEUROMUSCULAR REEDUCATION: CPT

## 2021-04-05 PROCEDURE — 97140 MANUAL THERAPY 1/> REGIONS: CPT

## 2021-04-05 NOTE — PROGRESS NOTES
Daily Note     Today's date: 2021  Patient name: Gerald Chandler  : 1970  MRN: [id number]  Referring provider: Gopal Martinez DO  Dx:   Encounter Diagnosis     ICD-10-CM    1  Acute pain of right shoulder  M25 511                   Subjective: "Off and on, still "  Objective: See treatment diary below      Assessment: Performed exercise program w/o complaint  Comfortable throughout manual therapies  Tolerated treatment well  Patient would benefit from continued PT      Plan: Continue per plan of care  Precautions: none      Manuals 4/5  3/4 3/10 3/15 3/17 3/22 3/24 3/29 3/31   PROM to rebecca 8'  8' 8'  8' 8' 8' 8' 8'   Joint mob AP GR III  KL  Gr II-III DL  Gr   III Gr III  KL GR III  KL GR III  MD GR III  MD GR III  MD                             Neuro Re-Ed             Cane ext/IR 10x10"  ea  10x10" ext 10x10" 10' x10" 10x10"  ea 10x10"  ea 10x10"  ea 10x10"  ea 10x10"  ea   Posture tband [employer]  GTB x20ea GTB  x20ea GTB x20 ea GTB  x20ea Blue  x15ea [place]  x20ea [employer]  x20ea   Prone flex/h ab/ext x15ea       NV x10ea x10ea                                                       Ther Ex             UBE (f/b) 3'/3'  3'/3' 3'/3' 3'/3' 3'/3' 3'/3' 3'/3' 3'/3' 3'/3'   Wall slides (flex/ab)             Pulleys (flex/ab) 10x10"  10x10" 10x10" 10" x10 10x10"  ea 10x10"  ea 10x10"  ea 10x10"  ea 10x10"  ea   Sleeper stretch   3x30"  3x30"         Supine cane (flex/ab/ER) 10x10"  ea  Flex 10x10" Flex  10x10" Flex 10" x10 Flex  10x10" 10x10"  ea 10x10"  ea 10x10"  ea 10x10"ea   Supine flexion x20   x15 x15 x15 x20 x20 x20 NP   SL serratus punch x20    x15 x15 x20  x20 NP   Push up plus on wall x15      NV x10 x10 x15   Shoulder ER   Red 15x Red  20x Red x20 Red  x20 Grn  x15 Grn  x20 held held   Shoulder IR Blue  x20  Grn 15x Grn  20x Grn 20x Grn  20x Grn  x20 Blue  x15   Blue  x20 [place] [place]  x20                                          Ther Activity

## 2021-04-07 ENCOUNTER — EVALUATION (OUTPATIENT)
Dept: PHYSICAL THERAPY | Facility: CLINIC | Age: 51
End: 2021-04-07
Payer: COMMERCIAL

## 2021-04-07 ENCOUNTER — TRANSCRIBE ORDERS (OUTPATIENT)
Dept: PHYSICAL THERAPY | Facility: CLINIC | Age: 51
End: 2021-04-07

## 2021-04-07 DIAGNOSIS — M25.511 ACUTE PAIN OF RIGHT SHOULDER: Primary | ICD-10-CM

## 2021-04-07 PROCEDURE — 97164 PT RE-EVAL EST PLAN CARE: CPT | Performed by: PHYSICAL THERAPIST

## 2021-04-07 PROCEDURE — 97140 MANUAL THERAPY 1/> REGIONS: CPT | Performed by: PHYSICAL THERAPIST

## 2021-04-07 PROCEDURE — 97112 NEUROMUSCULAR REEDUCATION: CPT | Performed by: PHYSICAL THERAPIST

## 2021-04-07 PROCEDURE — 97110 THERAPEUTIC EXERCISES: CPT | Performed by: PHYSICAL THERAPIST

## 2021-04-07 NOTE — PROGRESS NOTES
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  Pt would benefit from continued skilled PT to further address impairments and to return to PLOF  Goals  1  Pt will be independent with HEP upon DC - ongoing  2  Pt's R UE ROM will increase by at least 25% to allow for dressing and reaching with ease  - met  3  Pt's R UE strength will be 5/5 t/o and pain free to allow for completing duties at work  - ongoing  4  Pt's pain will be no more than 2/10 to allow for uninterrupted sleep  - ongoing      Plan  Patient would benefit from: skilled physical therapy  Planned modality interventions: low level laser therapy  Planned therapy interventions: joint mobilization, manual therapy, neuromuscular re-education, therapeutic activities and therapeutic exercise  Frequency: 2x week  Duration in visits: 8  Duration in weeks: 4  Treatment plan discussed with: patient        Subjective Evaluation    Pain  Current pain ratin  At best pain ratin  At worst pain ratin  Location: R shoulder        Patient Goals  Patient goals for therapy: decreased pain          Objective     Active Range of Motion     Right Shoulder   Flexion: 160 degrees   Abduction: 160 degrees   External rotation 90°: 55 degrees  Internal rotation 90°: 50 degrees     Passive Range of Motion     Right Shoulder   Flexion: 170 degrees   Abduction: 162 degrees External rotation 90°: 60 degrees   Internal rotation 90°: 55 degrees     Strength/Myotome Testing     Right Shoulder     Planes of Motion   Flexion: 5   Abduction: 4   External rotation at 90°: 4   Internal rotation at 90°: 5         Precautions: none      Manuals 4/5 4/7      3/24 3/29 3/31   PROM to rebecca 8' 8'      8' 8' 8'   Joint mob AP GR III  KL MD Kaylyn Klein III  MD GR III  MD GR III  MD   Laser to R biceps insertion  4'                        Neuro Re-Ed             Cane ext/IR 10x10"  ea       10x10"  ea 10x10"  ea 10x10"  ea   Posture tband Blue  x20ea BTB x20ea      Blue  x20ea Blue  x20ea Blue  x20ea   Prone flex/h ab/ext x15ea x15ea      NV x10ea x10ea   IR stretch with strap  3x30"                                                  Ther Ex             UBE (f/b) 3'/3' 3'/3'      3'/3' 3'/3' 3'/3'   Wall slides (flex/ab)  HEP           Pulleys (flex/ab) 10x10"       10x10"  ea 10x10"  ea 10x10"  ea   Sleeper stretch             Supine cane (flex/ab/ER) 10x10"  ea Red flex/ER 10x10"      10x10"  ea 10x10"  ea 10x10"ea   Supine flexion x20 1#x15      x20 x20 NP   SL serratus punch x20 1#x15       x20 NP   SL abduction  1#x15           SL ER  1#x15           Push up plus on wall x15       x10 x10 x15   Shoulder IR Blue  x20 DC      Blue  x15   Blue  x20 Blue  x20                                          Ther Activity

## 2021-04-07 NOTE — LETTER
2021    Karishma Barrett DO  [address] [address] [address] [facility]    Patient: Valente Francois   YOB: 1970   Date of Visit: 2021     Encounter Diagnosis     ICD-10-CM    1  Acute pain of right shoulder  M25 511        Dear Dr Anastacio Torres:    Thank you for your recent referral of Valente Francois  Please review the attached evaluation summary from Fran's recent visit  Please verify that you agree with the plan of care by signing the attached order  If you have any questions or concerns, please do not hesitate to call  I sincerely appreciate the opportunity to share in the care of one of your patients and hope to have another opportunity to work with you in the near future  Sincerely,    Josh Lennon, PT      Referring Provider:      I certify that I have read the below Plan of Care and certify the need for these services furnished under this plan of treatment while under my care  Karishma Barrett DO  [address] [facility]  Via Fax: [phone]          Daily Note/Re-evaluation     Today's date: 2021  Patient name: Valente Francois  : 1970  MRN: [id number]  Referring provider: Tatum Ashton DO  Dx:   Encounter Diagnosis     ICD-10-CM    1  Acute pain of right shoulder  M25 511                   Subjective: "I have good and bad days "      Objective: See treatment diary below    Assessment  Pt has made substantial gains in R UE ROM  Pt reports a decrease in overall pain levels  Pt no longer has interrupted sleep or difficulty dressing  Pt also reports improvements in getting out of work equipment  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  Pt would benefit from continued skilled PT to further address impairments and to return to PLOF  Goals  1  Pt will be independent with HEP upon DC - ongoing  2  Pt's R UE ROM will increase by at least 25% to allow for dressing and reaching with ease  - met  3  Pt's R UE strength will be 5/5 t/o and pain free to allow for completing duties at work  - ongoing  4  Pt's pain will be no more than 2/10 to allow for uninterrupted sleep  - ongoing      Plan  Patient would benefit from: skilled physical therapy  Planned modality interventions: low level laser therapy  Planned therapy interventions: joint mobilization, manual therapy, neuromuscular re-education, therapeutic activities and therapeutic exercise  Frequency: 2x week  Duration in visits: 8  Duration in weeks: 4  Treatment plan discussed with: patient        Subjective Evaluation    Pain  Current pain ratin  At best pain ratin  At worst pain ratin  Location: R shoulder        Patient Goals  Patient goals for therapy: decreased pain          Objective     Active Range of Motion     Right Shoulder   Flexion: 160 degrees   Abduction: 160 degrees   External rotation 90°: 55 degrees  Internal rotation 90°: 50 degrees     Passive Range of Motion     Right Shoulder   Flexion: 170 degrees   Abduction: 162 degrees   External rotation 90°: 60 degrees   Internal rotation 90°: 55 degrees     Strength/Myotome Testing     Right Shoulder     Planes of Motion   Flexion: 5   Abduction: 4   External rotation at 90°: 4   Internal rotation at 90°: 5         Precautions: none      Manuals 4/5 4/7      3/24 3/29 3/31   PROM to rebecca 8' 8'      8' 8' 8'   Joint mob AP GR III  KL MD Torres Pedlar III  MD GR III  MD GR III  MD   Laser to R biceps insertion  4'                        Neuro Re-Ed             Cane ext/IR 10x10"  ea       10x10"  ea 10x10"  ea 10x10"  ea   Posture tband Blue  x20ea BTB x20ea      Blue  x20ea Blue  x20ea Blue  x20ea   Prone flex/h ab/ext x15ea x15ea      NV x10ea x10ea   IR stretch with strap  3x30"                                                  Ther Ex             UBE (f/b) 3'/3' 3'/3'      3'/3' 3'/3' 3'/3'   Wall slides (flex/ab)  HEP           Pulleys (flex/ab) 10x10"       10x10"  ea 10x10"  ea 10x10"  ea   Sleeper stretch             Supine cane (flex/ab/ER) 10x10"  ea Red flex/ER 10x10"      10x10"  ea 10x10"  ea 10x10"ea   Supine flexion x20 1#x15      x20 x20 NP   SL serratus punch x20 1#x15       x20 NP   SL abduction  1#x15           SL ER  1#x15           Push up plus on wall x15       x10 x10 x15   Shoulder IR Blue  x20 DC      Blue  x15   Blue  x20 Blue  x20                                          Ther Activity

## 2021-04-12 ENCOUNTER — OFFICE VISIT (OUTPATIENT)
Dept: PHYSICAL THERAPY | Facility: CLINIC | Age: 51
End: 2021-04-12
Payer: COMMERCIAL

## 2021-04-12 DIAGNOSIS — M25.511 ACUTE PAIN OF RIGHT SHOULDER: Primary | ICD-10-CM

## 2021-04-12 PROCEDURE — 97112 NEUROMUSCULAR REEDUCATION: CPT | Performed by: PHYSICAL THERAPIST

## 2021-04-12 PROCEDURE — 97140 MANUAL THERAPY 1/> REGIONS: CPT | Performed by: PHYSICAL THERAPIST

## 2021-04-12 PROCEDURE — 97110 THERAPEUTIC EXERCISES: CPT | Performed by: PHYSICAL THERAPIST

## 2021-04-12 NOTE — PROGRESS NOTES
Daily Note     Today's date: 2021  Patient name: Ally Cottrell  : 1970  MRN: [id number]  Referring provider: Supriya Pineda DO  Dx:   Encounter Diagnosis     ICD-10-CM    1  Acute pain of right shoulder  M25 511                   Subjective: "I've been feeling better "      Objective: See treatment diary below      Assessment: Pt had good tolerance to progression  Notable improvements in all ROM  Plan: Continue per plan of care        Precautions: none       Manuals 4/5 4/7 4/12     3/24 3/29 3/31   PROM to rebecca 8' 8' 8'     8' 8' 8'   Joint mob AP Serafin DAVISON III  MD   Laser to R biceps insertion  4' 4'                       Neuro Re-Ed             Neris Chavarriaast ext/IR 10x10"  ea       10x10"  ea 10x10"  ea 10x10"  ea   Posture tband Blue  x20ea BTB x20ea BTB x20ea     Blue  x20ea Blue  x20ea Blue  x20ea   Prone flex/h ab/ext x15ea x15ea x20ea     NV x10ea x10ea   IR stretch with strap  3x30" 3x30"                                                 Ther Ex             UBE (f/b) 3'/3' 3'/3' 3'/3'     3'/3' 3'/3' 3'/3'   Pulleys (flex/ab) 10x10"       10x10"  ea 10x10"  ea 10x10"  ea   Sleeper stretch             Supine cane (flex/ab/ER) 10x10"  ea Red flex/ER 10x10" Red flex/ER 10x10"     10x10"  ea 10x10"  ea 10x10"ea   Supine flexion x20 1#x15 1#x20     x20 x20 NP   SL serratus punch x20 1#x15 1#x20      x20 NP   SL abduction  1#x15 1#x20          SL ER  1#x15 1#x20          Push up plus on wall x15       x10 x10 x15                                          Ther Activity

## 2021-04-14 ENCOUNTER — OFFICE VISIT (OUTPATIENT)
Dept: PHYSICAL THERAPY | Facility: CLINIC | Age: 51
End: 2021-04-14
Payer: COMMERCIAL

## 2021-04-14 DIAGNOSIS — M25.511 ACUTE PAIN OF RIGHT SHOULDER: Primary | ICD-10-CM

## 2021-04-14 PROCEDURE — 97112 NEUROMUSCULAR REEDUCATION: CPT

## 2021-04-14 PROCEDURE — 97110 THERAPEUTIC EXERCISES: CPT

## 2021-04-14 PROCEDURE — 97140 MANUAL THERAPY 1/> REGIONS: CPT

## 2021-04-14 NOTE — PROGRESS NOTES
Daily Note     Today's date: 2021  Patient name: Alexus Shukla  : 1970  MRN: [id number]  Referring provider: Janice Barnard DO  Dx:   Encounter Diagnosis     ICD-10-CM    1  Acute pain of right shoulder  M25 511                   Subjective: Pt states his R shoulder is "better," adding "my back is bothering me today "      Objective: See treatment diary below      Assessment: Performed exercise progressions w/o issue  Comfortable throughout manual therapies  Tolerated treatment well  Patient would benefit from continued PT      Plan: Continue per plan of care        Precautions: none       Manuals 4/5 4/7 4/12 4/14    3/24 3/29 3/31   PROM to rebecca 8' 8' 8' 8'    8' 8' 8'   Joint mob AP Darlene DAVISON III  MD   Laser to R biceps insertion  4' 4' 4'                      Neuro Re-Ed             Kim Oqeundo ext/IR 10x10"  ea       10x10"  ea 10x10"  ea 10x10"  ea   Posture tband Blue  x20ea BTB x20ea BTB x20ea BTB  x20ea    Blue  x20ea Blue  x20ea Blue  x20ea   Prone flex/h ab/ext x15ea x15ea x20ea x20ea    NV x10ea x10ea   IR stretch with strap  3x30" 3x30" 3x30"                                                Ther Ex             UBE (f/b) 3'/3' 3'/3' 3'/3' 3'/3'    3'/3' 3'/3' 3'/3'   Pulleys (flex/ab) 10x10"       10x10"  ea 10x10"  ea 10x10"  ea   Sleeper stretch             Supine cane (flex/ab/ER) 10x10"  ea Red flex/ER 10x10" Red flex/ER 10x10" Red  Flex/ER  10x10"    10x10"  ea 10x10"  ea 10x10"ea   Supine flexion x20 1#x15 1#x20 2#x20    x20 x20 NP   SL serratus punch x20 1#x15 1#x20 2#x20     x20 NP   SL abduction  1#x15 1#x20 2#x         SL ER  1#x15 1#x20 2#x20         Push up plus on wall x15       x10 x10 x15                                          Ther Activity

## 2021-04-19 ENCOUNTER — OFFICE VISIT (OUTPATIENT)
Dept: PHYSICAL THERAPY | Facility: CLINIC | Age: 51
End: 2021-04-19
Payer: COMMERCIAL

## 2021-04-19 DIAGNOSIS — M25.511 ACUTE PAIN OF RIGHT SHOULDER: Primary | ICD-10-CM

## 2021-04-19 PROCEDURE — 97112 NEUROMUSCULAR REEDUCATION: CPT | Performed by: PHYSICAL THERAPIST

## 2021-04-19 PROCEDURE — 97140 MANUAL THERAPY 1/> REGIONS: CPT | Performed by: PHYSICAL THERAPIST

## 2021-04-19 PROCEDURE — 97110 THERAPEUTIC EXERCISES: CPT | Performed by: PHYSICAL THERAPIST

## 2021-04-19 NOTE — PROGRESS NOTES
Daily Note     Today's date: 2021  Patient name: Jamie Jiménez  : 1970  MRN: [id number]  Referring provider: Brandie Patiño DO  Dx:   Encounter Diagnosis     ICD-10-CM    1  Acute pain of right shoulder  M25 511                   Subjective: pt reports that he feel he is improving  Objective: See treatment diary below      Assessment: increased reps with therex as listed with no complaints other then fatigue  Requires occasional VC's for form during scap punches  ROM remains limited in rotation but is improved with manual tx  Plan: Continue per plan of care        Precautions: none       Manuals 4/5 4/7 4/12 4/14 4/19   3/24 3/29 3/31   PROM to rebecca 8' 8' 8' 8' kl   8' 8' 8'   Joint mob AP Diana Mitchell III  MD Saskia DAVISON III  MD   Laser to R biceps insertion  4' 4' 4' kl                     Neuro Re-Ed             Neal Distad ext/IR 10x10"  ea       10x10"  ea 10x10"  ea 10x10"  ea   Posture tband Blue  x20ea BTB x20ea BTB x20ea BTB  x20ea Black tb x30   Zalla  x20ea [place]  x20ea   Prone flex/h ab/ext x15ea x15ea x20ea x20ea Black tb x30   NV x10ea x10ea   IR stretch with strap  3x30" 3x30" 3x30" 3x30"                                               Ther Ex             UBE (f/b) 3'/3' 3'/3' 3'/3' 3'/3' 3'/3'   3'/3' 3'/3' 3'/3'   Pulleys (flex/ab) 10x10"       10x10"  ea 10x10"  ea 10x10"  ea   Sleeper stretch             Supine cane (flex/ab/ER) 10x10"  ea Red flex/ER 10x10" Red flex/ER 10x10" Red  Flex/ER  10x10" 10  "x10   10x10"  ea 10x10"  ea 10x10"ea   Supine flexion x20 1#x15 1#x20 2#x20 2#x30   x20 x20 NP   SL serratus punch x20 1#x15 1#x20 2#x20 2#x30    x20 NP   SL abduction  1#x15 1#x20 2#x 2#x30        SL ER  1#x15 1#x20 2#x20 2#x30        Push up plus on wall x15       x10 x10 x15                                          Ther Activity

## 2021-04-21 ENCOUNTER — OFFICE VISIT (OUTPATIENT)
Dept: PHYSICAL THERAPY | Facility: CLINIC | Age: 51
End: 2021-04-21
Payer: COMMERCIAL

## 2021-04-21 DIAGNOSIS — M25.511 ACUTE PAIN OF RIGHT SHOULDER: Primary | ICD-10-CM

## 2021-04-21 PROCEDURE — 97110 THERAPEUTIC EXERCISES: CPT

## 2021-04-21 PROCEDURE — 97140 MANUAL THERAPY 1/> REGIONS: CPT

## 2021-04-21 PROCEDURE — 97112 NEUROMUSCULAR REEDUCATION: CPT

## 2021-04-21 NOTE — PROGRESS NOTES
Daily Note     Today's date: 2021  Patient name: Immanuel Bettencourt  : 1970  MRN: [id number]  Referring provider: Muna Low DO  Dx:   Encounter Diagnosis     ICD-10-CM    1  Acute pain of right shoulder  M25 511                   Subjective: "It's definitely getting better "      Objective: See treatment diary below      Assessment: Performed exercise program w/o difficulty or discomfort  Cont to require vc'ing throughout same  Able to rebecca manual therapies  Tolerated treatment well  Patient would benefit from continued PT      Plan: Continue per plan of care        Precautions: none       Manuals 4/5 4/7 4/12 4/14 4/19 4/21  3/24 3/29 3/31   PROM to rebecca 8' 8' 8' 8' kl 8'  8' 8' 8'   Joint mob AP Twila Hora MD MD MD Melonie Herrlich III MD Shirline Schilder III MD GR III  MD   Laser to R biceps insertion  4' 4' 4' kl 4'                    Neuro Re-Ed             Leeanna Damme ext/IR 10x10"  ea       10x10"  ea 10x10"  ea 10x10"  ea   Posture tband Blue  x20ea BTB x20ea BTB x20ea BTB  x20ea Black tb x30 Black  x30ea    [facility]  [id number]   Prone flex/h ab/ext x15ea x15ea x20ea x20ea Black tb x30 Black  x30ea  NV x10ea x10ea   IR stretch with strap  3x30" 3x30" 3x30" 3x30" 3x30"                                              Ther Ex             UBE (f/b) 3'/3' 3'/3' 3'/3' 3'/3' 3'/3' 3'/3'  3'/3' 3'/3' 3'/3'   Pulleys (flex/ab) 10x10"       10x10"  ea 10x10"  ea 10x10"  ea   Sleeper stretch             Supine cane (flex/ab/ER) 10x10"  ea Red flex/ER 10x10" Red flex/ER 10x10" Red  Flex/ER  10x10" 10  "x10 10x10"  red  10x10"  ea 10x10"  ea 10x10"ea   Supine flexion x20 1#x15 1#x20 2#x20 2#x30 2#x30  x20 x20 NP   SL serratus punch x20 1#x15 1#x20 2#x20 2#x30 2#x30   x20 NP   SL abduction  1#x15 1#x20 2#x 2#x30 2#x30       SL ER  1#x15 1#x20 2#x20 2#x30 2#x30       Push up plus on wall x15       x10 x10 x15                                          Ther Activity

## 2021-04-26 ENCOUNTER — OFFICE VISIT (OUTPATIENT)
Dept: PHYSICAL THERAPY | Facility: CLINIC | Age: 51
End: 2021-04-26
Payer: COMMERCIAL

## 2021-04-26 DIAGNOSIS — M25.511 ACUTE PAIN OF RIGHT SHOULDER: Primary | ICD-10-CM

## 2021-04-26 PROCEDURE — 97110 THERAPEUTIC EXERCISES: CPT | Performed by: PHYSICAL THERAPIST

## 2021-04-26 PROCEDURE — 97112 NEUROMUSCULAR REEDUCATION: CPT | Performed by: PHYSICAL THERAPIST

## 2021-04-26 PROCEDURE — 97140 MANUAL THERAPY 1/> REGIONS: CPT | Performed by: PHYSICAL THERAPIST

## 2021-04-28 ENCOUNTER — OFFICE VISIT (OUTPATIENT)
Dept: PHYSICAL THERAPY | Facility: CLINIC | Age: 51
End: 2021-04-28
Payer: COMMERCIAL

## 2021-04-28 DIAGNOSIS — M25.511 ACUTE PAIN OF RIGHT SHOULDER: Primary | ICD-10-CM

## 2021-04-28 PROCEDURE — 97112 NEUROMUSCULAR REEDUCATION: CPT

## 2021-04-28 PROCEDURE — 97140 MANUAL THERAPY 1/> REGIONS: CPT

## 2021-04-28 PROCEDURE — 97110 THERAPEUTIC EXERCISES: CPT

## 2021-04-28 NOTE — PROGRESS NOTES
Daily Note     Today's date: 2021  Patient name: Gerald Chandler  : 1970  MRN: [id number]  Referring provider: Gopal Martinez DO  Dx:   Encounter Diagnosis     ICD-10-CM    1  Acute pain of right shoulder  M25 511                   Subjective: Pre-treatment, pt reports no pain in R shoulder  Pt states, " I have good days and bad days "  Since "re-injuring" the right shoulder late last week, pt reports the pain is "definitely better today "   Pt states that next Friday he is going for an MRI for further evaluation  Objective: See treatment diary below  Assessment: Patient demonstrates good form when performing TE  Pt notes limited R shoulder ER and pain when performing supine wand exercise however passively R shoulder ER is WFLs  Plan: Continue per plan of care           Precautions: none       Manuals      PROM to rebecca 8' 8' 8' 8' kl 8' MD JLW 8'     Joint mob AP Manolo Kendrick MD, MD, MD kl np MD np     Laser to R biceps insertion  4' 4' 4' kl 4' 4' JLW 4'                  Neuro Re-Ed             Cane ext/IR 10x10"  ea       10x10"ea     Posture tband Blue  x20ea BTB x20ea BTB x20ea BTB  x20ea Black tb x30 Black  x30ea   Black x30ea      Prone flex/h ab/ext x15ea x15ea x20ea x20ea Black tb x30 Black  x30ea x20ea x20ea     IR stretch with strap  3x30" 3x30" 3x30" 3x30" 3x30" 3x30" 4x30"                                            Ther Ex             UBE (f/b) 3'/3' 3'/3' 3'/3' 3'/3' 3'/3' 3'/3' 3'/3' 4'/4'     Pulleys (flex/ab) 10x10"       np     Sleeper stretch             Supine cane (flex/ab/ER) 10x10"  ea Red flex/ER 10x10" Red flex/ER 10x10" Red  Flex/ER  10x10" 10  "x10 10x10"  red 10x10" red 10x10" Flex/ ER  red     Supine flexion x20 1#x15 1#x20 2#x20 2#x30 2#x30 2#x20 2# x20     SL serratus punch x20 1#x15 1#x20 2#x20 2#x30 2#x30 2#x20 2#x20     SL abduction  1#x15 1#x20 2#x 2#x30 2#x30 2#x20 2#x20     SL ER  1#x15 1#x20 2#x20 2#x30 2#x30 2#x20 2#x20 Push up plus on wall x15       NP                                            Ther Activity

## 2021-05-03 ENCOUNTER — OFFICE VISIT (OUTPATIENT)
Dept: PHYSICAL THERAPY | Facility: CLINIC | Age: 51
End: 2021-05-03
Payer: COMMERCIAL

## 2021-05-03 DIAGNOSIS — M25.511 ACUTE PAIN OF RIGHT SHOULDER: Primary | ICD-10-CM

## 2021-05-03 PROCEDURE — 97110 THERAPEUTIC EXERCISES: CPT

## 2021-05-03 PROCEDURE — 97140 MANUAL THERAPY 1/> REGIONS: CPT

## 2021-05-03 PROCEDURE — 97112 NEUROMUSCULAR REEDUCATION: CPT

## 2021-05-05 ENCOUNTER — OFFICE VISIT (OUTPATIENT)
Dept: PHYSICAL THERAPY | Facility: CLINIC | Age: 51
End: 2021-05-05
Payer: COMMERCIAL

## 2021-05-05 DIAGNOSIS — M25.511 ACUTE PAIN OF RIGHT SHOULDER: Primary | ICD-10-CM

## 2021-05-05 PROCEDURE — 97140 MANUAL THERAPY 1/> REGIONS: CPT | Performed by: PHYSICAL THERAPIST

## 2021-05-05 PROCEDURE — 97112 NEUROMUSCULAR REEDUCATION: CPT | Performed by: PHYSICAL THERAPIST

## 2021-05-05 PROCEDURE — 97110 THERAPEUTIC EXERCISES: CPT | Performed by: PHYSICAL THERAPIST

## 2021-05-05 NOTE — PROGRESS NOTES
Daily Note     Today's date: 2021  Patient name: Lashay Cote  : 1970  MRN: [id number]  Referring provider: Merline Knack, DO  Dx:   Encounter Diagnosis     ICD-10-CM    1  Acute pain of right shoulder  M25 511                 Subjective: pt reports less pain today then prior to his LV  Reports that he scheduled for an MRI on 21      Objective: See treatment diary below      Assessment: good tolerance to manual tx with improved rom noted post-tx  No pain reported with the progression  Plan: Continue per plan of care        Precautions: none       Manuals 4/5 4/7 4/12 4/14 4/19 4/21 4/26 4/28 5/3 5/5   PROM to rebecca 8' 8' 8' 8' kl 8' MD JLW 8' 8' MO kl   Joint mob AP Nash omalley   Laser to R biceps insertion  4' 4' 4' kl 4' 4' JLW 4' 4' MO kl                Neuro Re-Ed             [place] ext/IR 10x10"  ea       10x10"ea 10x10"    Posture tband Blue  x20ea BTB x20ea BTB x20ea BTB  x20ea Black tb x30 Black  x30ea   Black x30ea  Black  x30ea Black x30   Prone flex/h ab/ext x15ea x15ea x20ea x20ea Black tb x30 Black  x30ea x20ea x20ea x20ea    IR stretch with strap  3x30" 3x30" 3x30" 3x30" 3x30" 3x30" 4x30" 4x30" 30"x4                                          Ther Ex             UBE (f/b) 3'/3' 3'/3' 3'/3' 3'/3' 3'/3' 3'/3' 3'/3' 4'/4' 4'/4' 4'/4   Pulleys (flex/ab) 10x10"       np     Sleeper stretch             Supine cane (flex/ab/ER) 10x10"  ea Red flex/ER 10x10" Red flex/ER 10x10" Red  Flex/ER  10x10" 10  "x10 10x10"  red 10x10" red 10x10" Flex/ ER  red 10x10"  Flex/ER  Red 10"x10 red bar   Supine flexion x20 1#x15 1#x20 2#x20 2#x30 2#x30 2#x20 2# x20 2#x20 2#x30   SL serratus punch x20 1#x15 1#x20 2#x20 2#x30 2#x30 2#x20 2#x20 2#x20 2#x30   SL abduction  1#x15 1#x20 2#x 2#x30 2#x30 2#x20 2#x20 2#x20 2#x30   SL ER  1#x15 1#x20 2#x20 2#x30 2#x30 2#x20 2#x20 2#x20 2#x30   Push up plus on wall x15       NP                                            Ther Activity

## 2021-05-06 ENCOUNTER — TELEPHONE (OUTPATIENT)
Dept: UROLOGY | Facility: MEDICAL CENTER | Age: 51
End: 2021-05-06

## 2021-05-06 NOTE — TELEPHONE ENCOUNTER
Testopel received in office 21  Medication packed and labeled with pt's name and , and placed in 101B cabinet for 21 appt with Dr Barry Pacheco

## 2021-05-10 ENCOUNTER — OFFICE VISIT (OUTPATIENT)
Dept: PHYSICAL THERAPY | Facility: CLINIC | Age: 51
End: 2021-05-10
Payer: COMMERCIAL

## 2021-05-10 DIAGNOSIS — M25.511 ACUTE PAIN OF RIGHT SHOULDER: Primary | ICD-10-CM

## 2021-05-10 PROCEDURE — 97110 THERAPEUTIC EXERCISES: CPT

## 2021-05-10 PROCEDURE — 97140 MANUAL THERAPY 1/> REGIONS: CPT

## 2021-05-10 PROCEDURE — 97530 THERAPEUTIC ACTIVITIES: CPT

## 2021-05-10 NOTE — PROGRESS NOTES
Daily Note     Today's date: 5/10/2021  Patient name: Sridhar Beasley  : 1970  MRN: [id number]  Referring provider: Kayli Ibrahim DO  Dx:   Encounter Diagnosis     ICD-10-CM    1  Acute pain of right shoulder  M25 511                   Subjective: "Not too good " Notes MRI results, slight tear and tenosynovitis    discussed possible injections, manipulation  Objective: See treatment diary below      Assessment: Performed exercise program w/o increasing symptoms  Comfortable throughout PROM to R shoulder  Tolerated treatment well  Patient would benefit from continued PT      Plan: Continue per plan of care        Precautions: none       Manuals  5/10  4/12 4/14 4/19 4/21 4/26 4/28 5/3 5/5   PROM to rebecca 8' MO  8' 8' kl 8' MD FU 8' 8' MO kl   Joint mob AP MD MD MD Barby Richardson np MD np MD omalley   Laser to R biceps insertion 4' MO  4' 4' kl 4' 4' JLW 4' 4' MO kl                Neuro Re-Ed             Ab Martinez ext/IR        10x10"ea 10x10"    Posture tband Black  x30  BTB x20ea BTB  x20ea Black tb x30 Black  x30ea   Black x30ea  Black  x30ea Black x30   Prone flex/h ab/ext x20ea  x20ea x20ea Black tb x30 Black  x30ea x20ea x20ea x20ea    IR stretch with strap 30"x4  3x30" 3x30" 3x30" 3x30" 3x30" 4x30" 4x30" 30"x4                                          Ther Ex             UBE (f/b) 4'/4'  3'/3' 3'/3' 3'/3' 3'/3' 3'/3' 4'/4' 4'/4' 4'/4   Pulleys (flex/ab)        np     Sleeper stretch             Supine cane (flex/ab/ER) 10x10" ea  Red  Flex/ER  Red flex/ER 10x10" Red  Flex/ER  10x10" 10  "x10 10x10"  red 10x10" red 10x10" Flex/ ER  red 10x10"  Flex/ER  Red 10"x10 red bar   Supine flexion 2#x30  1#x20 2#x20 2#x30 2#x30 2#x20 2# x20 2#x20 2#x30   SL serratus punch 2#x30  1#x20 2#x20 2#x30 2#x30 2#x20 2#x20 2#x20 2#x30   SL abduction 2#x30  1#x20 2#x 2#x30 2#x30 2#x20 2#x20 2#x20 2#x30   SL ER 2#x30  1#x20 2#x20 2#x30 2#x30 2#x20 2#x20 2#x20 2#x30   Push up plus on wall        NP                                            Ther Activity

## 2021-05-12 ENCOUNTER — OFFICE VISIT (OUTPATIENT)
Dept: PHYSICAL THERAPY | Facility: CLINIC | Age: 51
End: 2021-05-12
Payer: COMMERCIAL

## 2021-05-12 DIAGNOSIS — M25.511 ACUTE PAIN OF RIGHT SHOULDER: Primary | ICD-10-CM

## 2021-05-12 PROCEDURE — 97110 THERAPEUTIC EXERCISES: CPT | Performed by: PHYSICAL THERAPIST

## 2021-05-12 PROCEDURE — 97140 MANUAL THERAPY 1/> REGIONS: CPT | Performed by: PHYSICAL THERAPIST

## 2021-05-12 PROCEDURE — 97112 NEUROMUSCULAR REEDUCATION: CPT | Performed by: PHYSICAL THERAPIST

## 2021-05-12 NOTE — PROGRESS NOTES
Daily Note     Today's date: 2021  Patient name: Valente Francois  : 1970  MRN: [id number]  Referring provider: Tautm Ashton DO  Dx:   Encounter Diagnosis     ICD-10-CM    1  Acute pain of right shoulder  M25 511                   Subjective: Patient stated minimal pain prior to treatment session this visit  Objective: See treatment diary below      Assessment: Patient performed exercises without c/o pain; no c/o pain with manual shoulder PROM  Plan: Continue per plan of care  Precautions: none       Manuals  5/10 5/12 4/12 4/14 4/19 4/21 4/26 4/28 5/3 5/5   PROM to rebecca 8' MO KK 8' 8' kl 8' MD JLW 8' 8' MO kl   Joint mob AP MD Hammad Cole np MD np MD omalley   Laser to R biceps insertion 4' MO KK 5' 4' 4' kl 4' 4' JLW 4' 4' MO kl                Neuro Re-Ed             Lorrin Dejuan ext/IR        10x10"ea 10x10"    Posture tband Black  x30 Black  x30 ea   BTB x20ea BTB  x20ea Black tb x30 Black  x30ea   Black x30ea  Black  x30ea Black x30   Prone flex/h ab/ext x20ea np Sada Em Black tb x30 Black  x30ea x20ea x20ea x20ea    IR stretch with strap 30"x4 30"x4 3x30" 3x30" 3x30" 3x30" 3x30" 4x30" 4x30" 30"x4                                          Ther Ex             UBE (f/b) 4'/4' 4'/4' 3'/3' 3'/3' 3'/3' 3'/3' 3'/3' 4'/4' 4'/4' 4'/4   Pulleys (flex/ab)        np     Sleeper stretch             Supine cane (flex/ab/ER) 10x10" ea  Red  Flex/ER 10x10" ea  Red  Flex/ER Red flex/ER 10x10" Red  Flex/ER  10x10" 10  "x10 10x10"  red 10x10" red 10x10" Flex/ ER  red 10x10"  Flex/ER  Red 10"x10 red bar   Supine flexion 2#x30 2#x30 1#x20 2#x20 2#x30 2#x30 2#x20 2# x20 2#x20 2#x30   SL serratus punch 2#x30 2#x30 1#x20 2#x20 2#x30 2#x30 2#x20 2#x20 2#x20 2#x30   SL abduction 2#x30 2#x30 1#x20 2#x 2#x30 2#x30 2#x20 2#x20 2#x20 2#x30   SL ER 2#x30 2#x30 1#x20 2#x20 2#x30 2#x30 2#x20 2#x20 2#x20 2#x30   Push up plus on wall        NP                                            Ther Activity

## 2021-05-18 ENCOUNTER — OFFICE VISIT (OUTPATIENT)
Dept: PHYSICAL THERAPY | Facility: CLINIC | Age: 51
End: 2021-05-18
Payer: COMMERCIAL

## 2021-05-18 DIAGNOSIS — M25.511 ACUTE PAIN OF RIGHT SHOULDER: Primary | ICD-10-CM

## 2021-05-18 PROCEDURE — 97110 THERAPEUTIC EXERCISES: CPT

## 2021-05-18 PROCEDURE — 97530 THERAPEUTIC ACTIVITIES: CPT

## 2021-05-18 PROCEDURE — 97140 MANUAL THERAPY 1/> REGIONS: CPT

## 2021-05-18 NOTE — PROGRESS NOTES
Daily Note     Today's date: 2021  Patient name: Gabriel Laughlin  : 1970  MRN: [id number]  Referring provider: Linnette Yu DO  Dx:   Encounter Diagnosis     ICD-10-CM    1  Acute pain of right shoulder  M25 511                   Subjective: "It's getting better "      Objective: See treatment diary below      Assessment: Performed exercise progressions w/o difficulty or discomfort  Manual therapies w/o complaint  Tolerated treatment well  Patient would benefit from continued PT      Plan: Continue per plan of care  Precautions: none       Manuals  5/10 5/12  5/18  4/19 4/21 4/26 4/28 5/3 5/5   PROM to rebecca 8' MO KK 8' MO  kl 8' MD JLW 8' 8' MO kl   Joint mob AP MD Maxime Ko np MD np MD omalley   Laser to R biceps insertion 4' MO KK 5' 4' MO  kl 4' 4' JLW 4' 4' MO kl                Neuro Re-Ed             Jinnie Emiliano ext/IR        10x10"ea 10x10"    Posture tband Black  x30 Black  x30 ea   Black  x30  ea  Black tb x30 Black  x30ea   Black x30ea  Black  x30ea Black x30   Prone flex/h ab/ext x20ea np x20ea  Black tb x30 Black  x30ea x20ea x20ea x20ea    IR stretch with strap 30"x4 30"x4 30"x4  3x30" 3x30" 3x30" 4x30" 4x30" 30"x4                                          Ther Ex             UBE (f/b) 4'/4' 4'/4' 4'/4'  3'/3' 3'/3' 3'/3' 4'/4' 4'/4' 4'/4   Pulleys (flex/ab)        np     Sleeper stretch             Supine cane (flex/ab/ER) 10x10" ea  Red  Flex/ER 10x10" ea  Red  Flex/ER 10x10"  Ea red  Flex/ER  10  "x10 10x10"  red 10x10" red 10x10" Flex/ ER  red 10x10"  Flex/ER  Red 10"x10 red bar   Supine flexion 2#x30 2#x30 3#x15  2#x30 2#x30 2#x20 2# x20 2#x20 2#x30   SL serratus punch 2#x30 2#x30 3#x15  2#x30 2#x30 2#x20 2#x20 2#x20 2#x30   SL abduction 2#x30 2#x30 3#x15  2#x30 2#x30 2#x20 2#x20 2#x20 2#x30   SL ER 2#x30 2#x30 3#x15  2#x30 2#x30 2#x20 2#x20 2#x20 2#x30   Push up plus on wall        NP                                            Ther Activity

## 2021-05-20 ENCOUNTER — EVALUATION (OUTPATIENT)
Dept: PHYSICAL THERAPY | Facility: CLINIC | Age: 51
End: 2021-05-20
Payer: COMMERCIAL

## 2021-05-20 ENCOUNTER — TRANSCRIBE ORDERS (OUTPATIENT)
Dept: PHYSICAL THERAPY | Facility: CLINIC | Age: 51
End: 2021-05-20

## 2021-05-20 DIAGNOSIS — M25.511 ACUTE PAIN OF RIGHT SHOULDER: Primary | ICD-10-CM

## 2021-05-20 PROCEDURE — 97140 MANUAL THERAPY 1/> REGIONS: CPT

## 2021-05-20 PROCEDURE — 97110 THERAPEUTIC EXERCISES: CPT

## 2021-05-20 PROCEDURE — 97164 PT RE-EVAL EST PLAN CARE: CPT

## 2021-05-20 PROCEDURE — 97530 THERAPEUTIC ACTIVITIES: CPT

## 2021-05-20 NOTE — LETTER
May 20, 2021    Etta Veliz DO  [address] [facility]    Patient: Patricia Cramer   YOB: 1970   Date of Visit: 2021     Encounter Diagnosis     ICD-10-CM    1  Acute pain of right shoulder  M25 511        Dear Dr Dalila Senior:    Thank you for your recent referral of Patricia Cramer  Please review the attached evaluation summary from Fran's recent visit  Please verify that you agree with the plan of care by signing the attached order  If you have any questions or concerns, please do not hesitate to call  I sincerely appreciate the opportunity to share in the care of one of your patients and hope to have another opportunity to work with you in the near future  Sincerely,    Levi Guerrero, PT      Referring Provider:      I certify that I have read the below Plan of Care and certify the need for these services furnished under this plan of treatment while under my care  Etta Veliz DO  [facility]  Via Fax: [phone]          Daily Note/Re-evaluation     Today's date: 2021  Patient name: Patricia Cramer  : 1970  MRN: [id number]  Referring provider: Barbara Gastelum DO  Dx:   Encounter Diagnosis     ICD-10-CM    1  Acute pain of right shoulder  M25 511                   Subjective: Pt reports that he has noticed improvement in pain  Notes that pain at worst is 2/10  He continues with difficulty with reaching behind back  He notes scheduled to see physician again in about 2 weeks  Objective: See treatment diary below    Assessment  Pt continues with improvement in pain rating at worst as well as significant improvement in AROM that is mostly equivalent to PROM, however still lacks shoulder flex AROM relative to PROM  Pt also continuing with strength improvements which appear consistent with functional improvement   Pt will benefit from continued skilled PT as pt progressing with steady gains toward maximizing function  Goals  1  Pt will be independent with HEP upon DC - ongoing  2  Pt's R UE ROM will increase by at least 25% to allow for dressing and reaching with ease  - met  3  Pt's R UE strength will be 5/5 t/o and pain free to allow for completing duties at work  - ongoing  4  Pt's pain will be no more than 2/10 to allow for uninterrupted sleep - met      Plan  Patient would benefit from: skilled physical therapy  Planned modality interventions: low level laser therapy  Planned therapy interventions: joint mobilization, manual therapy, neuromuscular re-education, therapeutic activities and therapeutic exercise  Frequency: 2x week  Duration in visits: 8  Duration in weeks: 4  Treatment plan discussed with: patient        Subjective Evaluation    Pain  Current pain ratin  At best pain ratin  At worst pain ratin   Location: R shoulder        Patient Goals  Patient goals for therapy: decreased pain          Objective     Active Range of Motion     Right Shoulder   Flexion: 160 degrees   Abduction: 170 degrees   External rotation 90°: 60 degrees  Internal rotation 90°: 60 degrees     Passive Range of Motion      Right Shoulder   Flexion: 170 degrees   Abduction: 170 degrees   External rotation 90°: 60 degrees   Internal rotation 90°: 60 degrees     Strength/Myotome Testing     Right Shoulder     Planes of Motion   Flexion: 5   Abduction: 4   External rotation at 90°: 4+   Internal rotation at 90°: 5       Plan: Continue per plan of care  Precautions: none       Manuals  5/10 5/12  5/18 5/20  4/21 4/26 4/28 5/3 5/5   PROM to rebecca 8' MO KK 8' MO 8' DL  8' MD JLJONATHAN 8' 8' MO kl   Joint mob AP MD WHEATLEY KT DL  np MD sadie omalley   Laser to R biceps insertion 4' MO KK 5' 4' MO 4' DL  4' 4' JLW 4' 4' MO kl                Neuro Re-Ed             Lisbeth Blandon ext/IR        10x10"ea 10x10"    Posture tband Black  x30 Black  x30 ea   Black  x30  ea NV  Black  x30ea   Black x30ea  Black  x30ea Black x30   Prone flex/h ab/ext x20ea np Debi Luis  Black  x30ea x20ea x20ea x20ea    IR stretch with strap 30"x4 30"x4 30"x4 NV  3x30" 3x30" 4x30" 4x30" 30"x4                                          Ther Ex             UBE (f/b) 4'/4' 4'/4' 4'/4' 4'/4'  3'/3' 3'/3' 4'/4' 4'/4' 4'/4   Pulleys (flex/ab)        np     Sleeper stretch             Supine cane (flex/ab/ER) 10x10" ea  Red  Flex/ER 10x10" ea  Red  Flex/ER 10x10"  Ea red  Flex/ER 10x10"  Ea red  Flex/ER  10x10"  red 10x10" red 10x10" Flex/ ER  red 10x10"  Flex/ER  Red 10"x10 red bar   Supine flexion 2#x30 2#x30 3#x15 3#x15  2#x30 2#x20 2# x20 2#x20 2#x30   SL serratus punch 2#x30 2#x30 3#x15 3#x15  2#x30 2#x20 2#x20 2#x20 2#x30   SL abduction 2#x30 2#x30 3#x15 3#x15  2#x30 2#x20 2#x20 2#x20 2#x30   SL ER 2#x30 2#x30 3#x15 3#x15  2#x30 2#x20 2#x20 2#x20 2#x30   Push up plus on wall        NP     Tests/measures    See obj                                      Ther Activity

## 2021-05-20 NOTE — PROGRESS NOTES
Daily Note/Re-evaluation     Today's date: 2021  Patient name: Darlene Peters  : 1970  MRN: [id number]  Referring provider: Wesley Roque DO  Dx:   Encounter Diagnosis     ICD-10-CM    1  Acute pain of right shoulder  M25 511                   Subjective: Pt reports that he has noticed improvement in pain  Notes that pain at worst is 2/10  He continues with difficulty with reaching behind back  He notes scheduled to see physician again in about 2 weeks  Objective: See treatment diary below    Assessment  Pt continues with improvement in pain rating at worst as well as significant improvement in AROM that is mostly equivalent to PROM, however still lacks shoulder flex AROM relative to PROM  Pt also continuing with strength improvements which appear consistent with functional improvement  Pt will benefit from continued skilled PT as pt progressing with steady gains toward maximizing function  Goals  1  Pt will be independent with HEP upon DC - ongoing  2  Pt's R UE ROM will increase by at least 25% to allow for dressing and reaching with ease  - met  3  Pt's R UE strength will be 5/5 t/o and pain free to allow for completing duties at work  - ongoing  4   Pt's pain will be no more than 2/10 to allow for uninterrupted sleep - met      Plan  Patient would benefit from: skilled physical therapy  Planned modality interventions: low level laser therapy  Planned therapy interventions: joint mobilization, manual therapy, neuromuscular re-education, therapeutic activities and therapeutic exercise  Frequency: 2x week  Duration in visits: 8  Duration in weeks: 4  Treatment plan discussed with: patient        Subjective Evaluation    Pain  Current pain ratin  At best pain ratin  At worst pain ratin   Location: R shoulder        Patient Goals  Patient goals for therapy: decreased pain          Objective     Active Range of Motion     Right Shoulder   Flexion: 160 degrees   Abduction: 170 degrees External rotation 90°: 60 degrees  Internal rotation 90°: 60 degrees     Passive Range of Motion      Right Shoulder   Flexion: 170 degrees   Abduction: 170 degrees   External rotation 90°: 60 degrees   Internal rotation 90°: 60 degrees     Strength/Myotome Testing     Right Shoulder     Planes of Motion   Flexion: 5   Abduction: 4   External rotation at 90°: 4+   Internal rotation at 90°: 5       Plan: Continue per plan of care  Precautions: none       Manuals  5/10 5/12  5/18 5/20  4/21 4/26 4/28 5/3 5/5   PROM to rebecca 8' MO KK 8' MO 8' DL  8' MD TANK 8' 8' MO kl   Joint mob AP MD KK KT DL  np MD np MD kl   Laser to R biceps insertion 4' MO KK 5' 4' MO 4' DL  4' 4' JLW 4' 4' MO kl                Neuro Re-Ed             Neil Emiliano ext/IR        10x10"ea 10x10"    Posture tband Black  x30 Black  x30 ea  Black  x30  ea NV  Black  x30ea   Black x30ea  Black  x30ea Black x30   Prone flex/h ab/ext x20ea np x20ea x20ea  Black  x30ea x20ea x20ea x20ea    IR stretch with strap 30"x4 30"x4 30"x4 NV  3x30" 3x30" 4x30" 4x30" 30"x4                                          Ther Ex             UBE (f/b) 4'/4' 4'/4' 4'/4' 4'/4'  3'/3' 3'/3' 4'/4' 4'/4' 4'/4   Pulleys (flex/ab)        np     Sleeper stretch             Supine cane (flex/ab/ER) 10x10" ea  Red  Flex/ER 10x10" ea  Red  Flex/ER 10x10"  Ea red  Flex/ER 10x10"  Ea red  Flex/ER  10x10"  red 10x10" red 10x10" Flex/ ER  red 10x10"  Flex/ER  Red 10"x10 red bar   Supine flexion 2#x30 2#x30 3#x15 3#x15  2#x30 2#x20 2# x20 2#x20 2#x30   SL serratus punch 2#x30 2#x30 3#x15 3#x15  2#x30 2#x20 2#x20 2#x20 2#x30   SL abduction 2#x30 2#x30 3#x15 3#x15  2#x30 2#x20 2#x20 2#x20 2#x30   SL ER 2#x30 2#x30 3#x15 3#x15  2#x30 2#x20 2#x20 2#x20 2#x30   Push up plus on wall        NP     Tests/measures    See obj                                      Ther Activity

## 2021-05-24 ENCOUNTER — OFFICE VISIT (OUTPATIENT)
Dept: PHYSICAL THERAPY | Facility: CLINIC | Age: 51
End: 2021-05-24
Payer: COMMERCIAL

## 2021-05-24 DIAGNOSIS — M25.511 ACUTE PAIN OF RIGHT SHOULDER: Primary | ICD-10-CM

## 2021-05-24 PROCEDURE — 97140 MANUAL THERAPY 1/> REGIONS: CPT

## 2021-05-24 PROCEDURE — 97110 THERAPEUTIC EXERCISES: CPT

## 2021-05-24 PROCEDURE — 97530 THERAPEUTIC ACTIVITIES: CPT

## 2021-05-24 NOTE — PROGRESS NOTES
Daily Note     Today's date: 2021  Patient name: Gerard Chambers  : 1970  MRN: [id number]  Referring provider: Shahida Gaming DO  Dx:   Encounter Diagnosis     ICD-10-CM    1  Acute pain of right shoulder  M25 511                   Subjective: Pt states his R shoulder cont to feel better  Objective: See treatment diary below      Assessment: Performed exercise progressions w/o difficulty or discomfort  PROM to R shoulder w/o complaint  Tolerated treatment well  Patient would benefit from continued PT      Plan: Continue per plan of care  Precautions: none       Manuals  5/10 5/12  5/18 5/20 5/24  4/26 4/28 5/3 5/5   PROM to rebecca 8' MO KK 8' MO 8' DL 8' MO  MD FU 8' 8' MO kl   Joint mob AP MD Judy Flores MD np MD omalley   Laser to R biceps insertion 4' MO KK 5' 4' MO 4' DL 4' MO  4' JLW 4' 4' MO kl                Neuro Re-Ed             Wyvonna Shorten ext/IR        10x10"ea 10x10"    Posture tband Black  x30 Black  x30 ea  Black  x30  ea NV Black  x30ea  Black x30ea  Black  x30ea Black x30   Prone flex/h ab/ext x20ea np x20ea x20ea x20ea  x20ea x20ea x20ea    IR stretch with strap 30"x4 30"x4 30"x4 NV 30"x4  3x30" 4x30" 4x30" 30"x4                                          Ther Ex             UBE (f/b) 4'/4' 4'/4' 4'/4' 4'/4' 4'/4'  3'/3' 4'/4' 4'/4' 4'/4   Pulleys (flex/ab)        np     Sleeper stretch             Supine cane (flex/ab/ER) 10x10" ea  Red  Flex/ER 10x10" ea  Red  Flex/ER 10x10"  Ea red  Flex/ER 10x10"  Ea red  Flex/ER 10x  10"ea  red  10x10" red 10x10" Flex/ ER  red 10x10"  Flex/ER  Red 10"x10 red bar   Supine flexion 2#x30 2#x30 3#x15 3#x15 3#x20  2#x20 2# x20 2#x20 2#x30   SL serratus punch 2#x30 2#x30 3#x15 3#x15 3#x20  2#x20 2#x20 2#x20 2#x30   SL abduction 2#x30 2#x30 3#x15 3#x15 3#x  2#x20 2#x20 2#x20 2#x30   SL ER 2#x30 2#x30 3#x15 3#x15 3#  2#x20 2#x20 2#x20 2#x30   Push up plus on wall        NP     Tests/measures    See obj                                      Ther Activity

## 2021-05-25 ENCOUNTER — PROCEDURE VISIT (OUTPATIENT)
Dept: UROLOGY | Facility: MEDICAL CENTER | Age: 51
End: 2021-05-25
Payer: COMMERCIAL

## 2021-05-25 VITALS
HEIGHT: 71 IN | WEIGHT: 250 LBS | BODY MASS INDEX: 35 KG/M2 | DIASTOLIC BLOOD PRESSURE: 80 MMHG | SYSTOLIC BLOOD PRESSURE: 140 MMHG | HEART RATE: 76 BPM

## 2021-05-25 DIAGNOSIS — E29.1 TESTICULAR HYPOFUNCTION: Primary | ICD-10-CM

## 2021-05-25 PROCEDURE — 11980 IMPLANT HORMONE PELLET(S): CPT | Performed by: UROLOGY

## 2021-05-25 PROCEDURE — S0189 TESTOSTERONE PELLET 75 MG: HCPCS | Performed by: UROLOGY

## 2021-05-25 NOTE — PROGRESS NOTES
Procedures  Preoperative diagnosis:  Testicular hypogonadism    Postoperative diagnosis: Same    Operation:  Testopel implant    Surgeon:  Marc Byrnes MD    Anesthesia:  Xylocaine 1%, 12 mL    Procedure: The patient was positioned on the procedure table with his left side down  His right buttock was then prepped and draped in sterile fashion  A total of 12 mL of 1% xylocaine was infiltrated at the pellet insertion site and along 3 subcutaneous tracts to receive the pellets  A small skin incision was made  The subcutaneous tracts were created in each was filled with 4 pellets  Following implantation, a dry sterile dressing was applied  The procedure was completed  The patient tolerated well and was sent home in satisfactory condition  Blood loss was minimal     Return in 4 months for the next Testopel  As usual for this patient not return to work until May 27

## 2021-05-26 ENCOUNTER — TELEPHONE (OUTPATIENT)
Dept: UROLOGY | Facility: MEDICAL CENTER | Age: 51
End: 2021-05-26

## 2021-05-26 NOTE — TELEPHONE ENCOUNTER
----- [facility] sent at 5/25/2021  9:17 AM EDT -----  Regarding: Testopel  Patient is scheduled with Dr Nisa Brown 9/28 for Testbibil

## 2021-05-27 ENCOUNTER — APPOINTMENT (OUTPATIENT)
Dept: PHYSICAL THERAPY | Facility: CLINIC | Age: 51
End: 2021-05-27
Payer: COMMERCIAL

## 2021-06-03 ENCOUNTER — OFFICE VISIT (OUTPATIENT)
Dept: PHYSICAL THERAPY | Facility: CLINIC | Age: 51
End: 2021-06-03
Payer: COMMERCIAL

## 2021-06-03 DIAGNOSIS — M25.511 ACUTE PAIN OF RIGHT SHOULDER: Primary | ICD-10-CM

## 2021-06-03 PROCEDURE — 97110 THERAPEUTIC EXERCISES: CPT

## 2021-06-03 PROCEDURE — 97112 NEUROMUSCULAR REEDUCATION: CPT

## 2021-06-03 PROCEDURE — 97140 MANUAL THERAPY 1/> REGIONS: CPT

## 2021-06-03 NOTE — PROGRESS NOTES
Daily Note     Today's date: 6/3/2021  Patient name: Omer Prakash  : 1970  MRN: [id number]  Referring provider: Stuart Givens DO  Dx:   Encounter Diagnosis     ICD-10-CM    1  Acute pain of right shoulder  M25 511        Start Time: 1439  Stop Time: 1525  Total time in clinic (min): 46 minutes    Subjective: Patient reports his right shoulder seems to be doing good today  Patient feels right shoulder mobility is improved overall  Patient reports he is scheduled to return to the doctor on Monday  Objective: See treatment diary below  Assessment: Therapeutic exercise program is tolerated well  Mild right shoulder ROM limitations noted in ER and IR during the manual stretching today  Plan: Continue treatment as per PT plan of care  Precautions: none       Manuals  5/10 5/12  5/18 5/20 5/24 6/3   5/3 5/5   PROM to rebecca 8' [address] 8' MO 8' DL 8' MO 8' JLW   8' MO kl   Joint mob AP MD KK KT DL KT NP   MD omalley   Laser to R biceps insertion 4' MO KK 5' 4' MO 4' DL 4' MO 4' JLW   4' MO kl                Neuro Re-Ed             [place] ext/IR         10x10"    Posture tband Black  x30 Black  x30 ea   Black  x30  ea NV Black  x30ea black  30 ea   Black  x30ea Black x30   Prone flex/h ab/ext x20ea np x20ea x20ea x20ea 20 ea   x20ea    IR stretch with strap 30"x4 30"x4 30"x4 NV 30"x4 30"x4   4x30" 30"x4                                          Ther Ex             UBE (f/b) 4'/4' 4'/4' 4'/4' 4'/4' 4'/4' 3'/3'   4'/4' 4'/4   Pulleys (flex/ab)             Sleeper stretch             Supine cane (flex/ab/ER) 10x10" ea  Red  Flex/ER 10x10" ea  Red  Flex/ER 10x10"  Ea red  Flex/ER 10x10"  Ea red  Flex/ER 10x  10"ea  red NP   10x10"  Flex/ER  Red 10"x10 red bar   Supine flexion 2#x30 2#x30 3#x15 3#x15 3#x20 3# x20   2#x20 2#x30   SL serratus punch 2#x30 2#x30 3#x15 3#x15 3#x20 3# x20   2#x20 2#x30   SL abduction 2#x30 2#x30 3#x15 3#x15 3#x 3# x20   2#x20 2#x30   SL ER 2#x30 2#x30 3#x15 3#x15 3# 3# x20   2#x20 2#x30   Push up plus on wall             Tests/measures    See obj                                      Ther Activity

## 2021-06-07 ENCOUNTER — OFFICE VISIT (OUTPATIENT)
Dept: PHYSICAL THERAPY | Facility: CLINIC | Age: 51
End: 2021-06-07
Payer: COMMERCIAL

## 2021-06-07 DIAGNOSIS — M25.511 ACUTE PAIN OF RIGHT SHOULDER: Primary | ICD-10-CM

## 2021-06-07 PROCEDURE — 97110 THERAPEUTIC EXERCISES: CPT | Performed by: PHYSICAL THERAPIST

## 2021-06-07 PROCEDURE — 97140 MANUAL THERAPY 1/> REGIONS: CPT | Performed by: PHYSICAL THERAPIST

## 2021-06-07 PROCEDURE — 97112 NEUROMUSCULAR REEDUCATION: CPT | Performed by: PHYSICAL THERAPIST

## 2021-06-07 NOTE — PROGRESS NOTES
Daily Note     Today's date: 2021  Patient name: Darlene Peters  : 1970  MRN: [id number]  Referring provider: Wesley Roque DO  Dx:   Encounter Diagnosis     ICD-10-CM    1  Acute pain of right shoulder  M25 511                   Subjective: "I saw the Dr and she said she wasn't going to do any surgery "      Objective: See treatment diary below      Assessment: Pt had good tolerance to progression of program  Minimal restrictions in ROM  Pain noted at end range  Plan: Continue per plan of care  Precautions: none       Manuals  5/10 5/12  5/18 5/20 5/24 6/3 6/7      PROM to rebecca 8' MO KK 8' MO 8' DL 8' MO 8' JLW 8'      Joint mob AP MD KK KT DL KT NP MD      Laser to R biceps insertion 4' MO KK 5' 4' MO 4' DL 4' MO 4' JLW 4'                   Neuro Re-Ed             Lisbeth Blandon ext/IR             Posture tband Black  x30 Black  x30 ea  Black  x30  ea NV Black  x30ea black  30 ea black x30ea      Prone flex/h ab/ext x20ea np x20ea x20ea x20ea 20 ea 1#x20ea      IR stretch with strap 30"x4 30"x4 30"x4 NV 30"x4 30"x4 30" x3                                             Ther Ex             UBE (f/b) 4'/4' 4'/4' 4'/4' 4'/4' 4'/4' 3'/3' 3'/3'      Pulleys (flex/ab)             Sleeper stretch             Supine cane (flex/ab/ER) 10x10" ea  Red  Flex/ER 10x10" ea  Red  Flex/ER 10x10"  Ea red  Flex/ER 10x10"  Ea red  Flex/ER 10x  10"ea  red NP       Supine flexion 2#x30 2#x30 3#x15 3#x15 3#x20 3# x20 3#x30      SL serratus punch 2#x30 2#x30 3#x15 3#x15 3#x20 3# x20 3#x30      SL abduction 2#x30 2#x30 3#x15 3#x15 3#x 3# x20 3#x30      SL ER 2#x30 2#x30 3#x15 3#x15 3# 3# x20 3#x30      Push up plus on wall             Tests/measures    See obj                                      Ther Activity

## 2021-06-09 ENCOUNTER — OFFICE VISIT (OUTPATIENT)
Dept: PHYSICAL THERAPY | Facility: CLINIC | Age: 51
End: 2021-06-09
Payer: COMMERCIAL

## 2021-06-09 DIAGNOSIS — M25.511 ACUTE PAIN OF RIGHT SHOULDER: Primary | ICD-10-CM

## 2021-06-09 PROCEDURE — 97112 NEUROMUSCULAR REEDUCATION: CPT | Performed by: PHYSICAL THERAPIST

## 2021-06-09 PROCEDURE — 97110 THERAPEUTIC EXERCISES: CPT | Performed by: PHYSICAL THERAPIST

## 2021-06-09 PROCEDURE — 97140 MANUAL THERAPY 1/> REGIONS: CPT | Performed by: PHYSICAL THERAPIST

## 2021-06-09 NOTE — PROGRESS NOTES
Daily Note     Today's date: 2021  Patient name: Negrito Lerma  : 1970  MRN: [id number]  Referring provider: Felice Michele DO  Dx:   Encounter Diagnosis     ICD-10-CM    1  Acute pain of right shoulder  M25 511                   Subjective: "It's feeling pretty good today "      Objective: See treatment diary below      Assessment: Pt had good tolerance to progression of program  Able to obtain full ROM with PROM  Plan: Continue per plan of care  Precautions: none       Manuals  5/10 5/12  5/18 5/20 5/24 6/3 6/7 6/9     PROM to rebecca 8' MO KK 8' MO 8' DL 8' MO 8' JLW 8' 8'     Joint mob AP MD DIONI Mo KT NP MD TIWARI     Laser to R biceps insertion 4' MO KK 5' 4' MO 4' DL 4' MO 4' JLW 4' 4'                  Neuro Re-Ed             [place] ext/IR             Posture tband Black  x30 Black  x30 ea  Black  x30  ea NV Black  x30ea black  30 ea black x30ea Black x30ea     Prone flex/h ab/ext x20ea np x20ea x20ea x20ea 20 ea 1#x20ea 1# x30ea     IR stretch with strap 30"x4 30"x4 30"x4 NV 30"x4 30"x4 30" x3 30"x3     Diagonals with tband             H ab with tband        GTBx20     B ER with tband        GTBx20     Ther Ex             UBE (f/b) 4'/4' 4'/4' 4'/4' 4'/4' 4'/4' 3'/3' 3'/3' 3'/3'     Pulleys (flex/ab)             Sleeper stretch             Supine cane (flex/ab/ER) 10x10" ea  Red  Flex/ER 10x10" ea  Red  Flex/ER 10x10"  Ea red  Flex/ER 10x10"  Ea red  Flex/ER 10x  10"ea  red NP       Supine flexion 2#x30 2#x30 3#x15 3#x15 3#x20 3# x20 3#x30 3#x30     SL serratus punch 2#x30 2#x30 3#x15 3#x15 3#x20 3# x20 3#x30 3#x30     SL abduction 2#x30 2#x30 3#x15 3#x15 3#x 3# x20 3#x30 3#x30     SL ER 2#x30 2#x30 3#x15 3#x15 3# 3# x20 3#x30 DC     Push up plus on wall             Tests/measures    See obj                                      Ther Activity

## 2021-06-14 ENCOUNTER — OFFICE VISIT (OUTPATIENT)
Dept: PHYSICAL THERAPY | Facility: CLINIC | Age: 51
End: 2021-06-14
Payer: COMMERCIAL

## 2021-06-14 DIAGNOSIS — M25.511 ACUTE PAIN OF RIGHT SHOULDER: Primary | ICD-10-CM

## 2021-06-14 PROCEDURE — 97140 MANUAL THERAPY 1/> REGIONS: CPT

## 2021-06-14 PROCEDURE — 97112 NEUROMUSCULAR REEDUCATION: CPT

## 2021-06-14 PROCEDURE — 97110 THERAPEUTIC EXERCISES: CPT

## 2021-06-14 NOTE — PROGRESS NOTES
Daily Note     Today's date: 2021  Patient name: Irina Saleem  : 1970  MRN: [id number]  Referring provider: Brooklynn Macdonald DO  Dx: No diagnosis found  Subjective: "It's going good, it keeps getting better "      Objective: See treatment diary below      Assessment: Performed exercise program w/o difficulty or discomfort  Able to rebecca manual therapies  Tolerated treatment well  Will monitor  Patient would benefit from continued PT      Plan: Continue per plan of care  Precautions: none       Manuals  5/10 5/12  5/18 5/20 5/24 6/3 6/7 6/9 6/14    PROM to rebecca 8' MO KK 8' MO 8' DL 8' MO 8' JLW 8' 8' 8'    Joint mob AP MD DIONI Reeves KT NP MD MD CLARK    Laser to R biceps insertion 4' MO KK 5' 4' MO 4' DL 4' MO 4' JLW 4' 4' 4'                 Neuro Re-Ed             Cyclone ext/IR             Posture tband Black  x30 Black  x30 ea  Black  x30  ea NV Black  x30ea black  30 ea black x30ea Black x30ea Black  x30ea    Prone flex/h ab/ext x20ea np x20ea x20ea x20ea 20 ea 1#x20ea 1# x30ea 1#x30  ea    IR stretch with strap 30"x4 30"x4 30"x4 NV 30"x4 30"x4 30" x3 30"x3 30"x3    Diagonals with tband             H ab with tband        GTBx20 GTB  x20    B ER with tband        GTBx20 GTB  x20    Ther Ex             UBE (f/b) 4'/4' 4'/4' 4'/4' 4'/4' 4'/4' 3'/3' 3'/3' 3'/3' 3'/3'    Pulleys (flex/ab)             Sleeper stretch             Supine cane (flex/ab/ER) 10x10" ea  Red  Flex/ER 10x10" ea  Red  Flex/ER 10x10"  Ea red  Flex/ER 10x10"  Ea red  Flex/ER 10x  10"ea  red NP       Supine flexion 2#x30 2#x30 3#x15 3#x15 3#x20 3# x20 3#x30 3#x30 3#x30    SL serratus punch 2#x30 2#x30 3#x15 3#x15 3#x20 3# x20 3#x30 3#x30 3#x30    SL abduction 2#x30 2#x30 3#x15 3#x15 3#x 3# x20 3#x30 3#x30 3#x30    SL ER 2#x30 2#x30 3#x15 3#x15 3# 3# x20 3#x30 DC ----    Push up plus on wall             Tests/measures    See obj                                      Ther Activity

## 2021-06-16 ENCOUNTER — OFFICE VISIT (OUTPATIENT)
Dept: PHYSICAL THERAPY | Facility: CLINIC | Age: 51
End: 2021-06-16
Payer: COMMERCIAL

## 2021-06-16 DIAGNOSIS — M25.511 ACUTE PAIN OF RIGHT SHOULDER: Primary | ICD-10-CM

## 2021-06-16 PROCEDURE — 97110 THERAPEUTIC EXERCISES: CPT | Performed by: PHYSICAL THERAPIST

## 2021-06-16 PROCEDURE — 97112 NEUROMUSCULAR REEDUCATION: CPT | Performed by: PHYSICAL THERAPIST

## 2021-06-16 PROCEDURE — 97140 MANUAL THERAPY 1/> REGIONS: CPT | Performed by: PHYSICAL THERAPIST

## 2021-06-16 PROCEDURE — 97530 THERAPEUTIC ACTIVITIES: CPT | Performed by: PHYSICAL THERAPIST

## 2021-06-16 NOTE — PROGRESS NOTES
Daily Note     Today's date: 2021  Patient name: Sparkle Tony  : 1970  MRN: [id number]  Referring provider: Karly Acosta DO  Dx:   Encounter Diagnosis     ICD-10-CM    1  Acute pain of right shoulder  M25 511                   Subjective: "It's about the same or better "      Objective: See treatment diary below      Assessment: Pt had good tolerance to progression of program  Slight limitations at end range of motion with PROM  Plan: Continue per plan of care  Precautions: none       Manuals  5/10 5/12  5/18 5/20 5/24 6/3 6   PROM to rebecca 8' MO KK 8' MO 8' DL 8' MO 8' JLW 8' 8' 8' 8'   Joint mob AP MD DIONI Lazcano KT, NP, MD, MD, KT, MD   Laser to R biceps insertion 4' MO KK 5' 4' MO 4' DL 4' MO 4' JLW 4' 4' 4' 4'                Neuro Re-Ed             [place] ext/IR             Posture tband Black  x30 Black  x30 ea   Black  x30  ea NV Black  x30ea black  30 ea black x30ea Black x30ea Black  x30ea Black x30ea   Prone flex/h ab/ext x20ea np x20ea x20ea x20ea 20 ea 1#x20ea 1# x30ea 1#x30  ea 1#x30ea   IR stretch with strap 30"x4 30"x4 30"x4 NV 30"x4 30"x4 30" x3 30"x3 30"x3 30"x3   Diagonals with tband          NV   H ab with tband        GTBx20 GTB  x20 GTB x20   B ER with tband        GTBx20 GTB  x20 GTB x20   Ther Ex             UBE (f/b) 4'/4' 4'/4' 4'/4' 4'/4' 4'/4' 3'/3' 3'/3' 3'/3' 3'/3' 3'/3'   Sleeper stretch             Supine flexion 2#x30 2#x30 3#x15 3#x15 3#x20 3# x20 3#x30 3#x30 3#x30 3# x30   SL serratus punch 2#x30 2#x30 3#x15 3#x15 3#x20 3# x20 3#x30 3#x30 3#x30 3#x30   SL abduction 2#x30 2#x30 3#x15 3#x15 3#x 3# x20 3#x30 3#x30 3#x30 3#x30   Push up plus on wall                                       Ther Activity             Dolphin lift          2# x20   Squigz          2#x20   Bodyblade

## 2021-06-21 ENCOUNTER — OFFICE VISIT (OUTPATIENT)
Dept: PHYSICAL THERAPY | Facility: CLINIC | Age: 51
End: 2021-06-21
Payer: COMMERCIAL

## 2021-06-21 DIAGNOSIS — M25.511 ACUTE PAIN OF RIGHT SHOULDER: Primary | ICD-10-CM

## 2021-06-21 PROCEDURE — 97530 THERAPEUTIC ACTIVITIES: CPT

## 2021-06-21 PROCEDURE — 97110 THERAPEUTIC EXERCISES: CPT

## 2021-06-21 PROCEDURE — 97140 MANUAL THERAPY 1/> REGIONS: CPT

## 2021-06-21 PROCEDURE — 97112 NEUROMUSCULAR REEDUCATION: CPT

## 2021-06-21 NOTE — PROGRESS NOTES
Daily Note     Today's date: 2021  Patient name: Negrito Lerma  : 1970  MRN: [id number]  Referring provider: Felice Michele DO  Dx:   Encounter Diagnosis     ICD-10-CM    1  Acute pain of right shoulder  M25 511        Start Time: 1451  Stop Time: 1550  Total time in clinic (min): 59 minutes       Subjective: Patient reports his right shoulder is "a little bit sore but the movement is actually pretty good "  Patient reports he "used it a lot this weekend" adding that he picked up approximately 20,000 rocks and put them into a wheel [place]  Objective: See treatment diary below  Assessment: Progression of therapeutic exercise program is tolerated well  Slight limitation noted at end range IR and ER during the manual stretching  Plan: Continue treatment as per PT plan of care         Precautions: none       Manuals 6/21   5/20 5/24 6/3 6/7 6/9 6/14 6/16   PROM to rebecca 8' JLW   8' DL 8' MO 8' JLW 8' 8' 8' 8'   Joint mob AP NP   DL KT NP MD MD EDUARDO TIWARI   Laser to R biceps insertion 4' JLW   4' DL 4' MO 4' JLW 4' 4' 4' 4'                Neuro Re-Ed             Cane ext/IR             Posture tband black  30 ea   NV Black  x30ea black  30 ea black x30ea Black x30ea Black  x30ea Black x30ea   Prone flex/h ab/ext 1#  30 ea   x20ea x20ea 20 ea 1#x20ea 1# x30ea 1#x30  ea 1#x30ea   IR stretch with strap 30"x3   NV 30"x4 30"x4 30" x3 30"x3 30"x3 30"x3   Diagonals with tband D2 flex  D1 ext  green  15 ea         NV   H ab with tband blue  20       GTBx20 GTB  x20 GTB x20   B ER with tband blue  20       GTBx20 GTB  x20 GTB x20                Ther Ex             UBE (f/b)  4'/4'   4'/4' 4'/4' 3'/3' 3'/3' 3'/3' 3'/3' 3'/3'   Sleeper stretch             Supine flexion 3# x30   3#x15 3#x20 3# x20 3#x30 3#x30 3#x30 3# x30   SL serratus punch 3# x30   3#x15 3#x20 3# x20 3#x30 3#x30 3#x30 3#x30   SL abduction 3# x30   3#x15 3#x 3# x20 3#x30 3#x30 3#x30 3#x30   Push up plus on wall Ther Activity             Dolphin lift 2 5# x20         2# x20   Squigz 2 5#  18 squigz on and off         2#x20   Bodyblade

## 2021-06-23 ENCOUNTER — OFFICE VISIT (OUTPATIENT)
Dept: PHYSICAL THERAPY | Facility: CLINIC | Age: 51
End: 2021-06-23
Payer: COMMERCIAL

## 2021-06-23 DIAGNOSIS — M25.511 ACUTE PAIN OF RIGHT SHOULDER: Primary | ICD-10-CM

## 2021-06-23 PROCEDURE — 97530 THERAPEUTIC ACTIVITIES: CPT

## 2021-06-23 PROCEDURE — 97112 NEUROMUSCULAR REEDUCATION: CPT

## 2021-06-23 PROCEDURE — 97110 THERAPEUTIC EXERCISES: CPT

## 2021-06-23 PROCEDURE — 97140 MANUAL THERAPY 1/> REGIONS: CPT

## 2021-06-23 NOTE — PROGRESS NOTES
Daily Note     Today's date: 2021  Patient name: Margarette Oglesby  : 1970  MRN: [id number]  Referring provider: Chantel Vásquez DO  Dx:   Encounter Diagnosis     ICD-10-CM    1  Acute pain of right shoulder  M25 511        Start Time: 1445  Stop Time: 1545  Total time in clinic (min): 60 minutes       Subjective: Patient notes a little bit of soreness prior to therapy  Otherwise, feels really good  Objective: See treatment diary below  Assessment: Patient continues to have limited ER > IR PROM  Progressing well with strength and function  Continue to progress exercises as able  Plan: Continue treatment as per PT plan of care       Precautions: none    Manuals 6/21 6/23  5/20 5/24 6/3 6/7 6/9 6/14 6/16   PROM to rebecca 8' JLW 8' JM  8' DL 8' MO 8' JLW 8' 8' 8' 8'   Joint mob AP NP   DL KT NP MD MD EDUARDO TIWARI   Laser to R biceps insertion 4' JLW 4' JM  4' DL 4' MO 4' JLW 4' 4' 4' 4'                Neuro Re-Ed             Cane ext/IR             Posture tband black  30 ea Black 30 ea  NV Black  x30ea black  30 ea black x30ea Black x30ea Black  x30ea Black x30ea   Prone flex/h ab/ext 1#  30 ea 1#  30 ea  x20ea x20ea 20 ea 1#x20ea 1# x30ea 1#x30  ea 1#x30ea   IR stretch with strap 30"x3 30"x3  NV 30"x4 30"x4 30" x3 30"x3 30"x3 30"x3   Diagonals with tband D2 flex  D1 ext  green  15 ea D2 flex  D1 ext  green  15 ea        NV   H ab with tband blue  20 Blue  20x      GTBx20 GTB  x20 GTB x20   B ER with tband blue  20 Blue  20x      GTBx20 GTB  x20 GTB x20                Ther Ex             UBE (f/b)  4'/4' 4'/4'  4'/4' 4'/4' 3'/3' 3'/3' 3'/3' 3'/3' 3'/3'   Sleeper stretch             Supine flexion 3# x30 3# x30  3#x15 3#x20 3# x20 3#x30 3#x30 3#x30 3# x30   SL serratus punch 3# x30 3# x30  3#x15 3#x20 3# x20 3#x30 3#x30 3#x30 3#x30   SL abduction 3# x30 3# x30  3#x15 3#x 3# x20 3#x30 3#x30 3#x30 3#x30   Push up plus on wall                                       Ther Activity             Dolphin lift 2 5# x20 2 5# x20        2# x20   Squigz 2 5#  18 squigz on and off 2 5#  18 squigz on and off        2#x20   Bodyblade

## 2021-06-28 ENCOUNTER — OFFICE VISIT (OUTPATIENT)
Dept: PHYSICAL THERAPY | Facility: CLINIC | Age: 51
End: 2021-06-28
Payer: COMMERCIAL

## 2021-06-28 DIAGNOSIS — M25.511 ACUTE PAIN OF RIGHT SHOULDER: Primary | ICD-10-CM

## 2021-06-28 PROCEDURE — 97140 MANUAL THERAPY 1/> REGIONS: CPT

## 2021-06-28 PROCEDURE — 97112 NEUROMUSCULAR REEDUCATION: CPT

## 2021-06-28 PROCEDURE — 97110 THERAPEUTIC EXERCISES: CPT

## 2021-06-28 NOTE — PROGRESS NOTES
Daily Note     Today's date: 2021  Patient name: Omer Prakash  : 1970  MRN: [id number]  Referring provider: Stuart Givens DO  Dx:   Encounter Diagnosis     ICD-10-CM    1  Acute pain of right shoulder  M25 511                   Subjective: Patient states, "Every once in a while it bothers me "  Patient reports flexibility in his right shoulder when reaching behind his back is "a lot better "      Objective: See treatment diary below  Assessment: Therapeutic exercise program and manual therapy is tolerated well  Pending continued progress, patient may be able to reduce PT treatments to one time per week  Plan: Continue treatment as per PT plan of care         Precautions: none    Manuals 6/21 6/23 6/28  5/24 6/3 6/7 6/9 6/14 6/16   PROM to rebecca 8' JLW 8' JM 8' JLW  8' MO 8' JLW 8' 8' 8' 8'   Joint mob AP NP    KT NP MD MD EDUARDO TIWARI   Laser to R biceps insertion 4' JLW 4' JM 4' JLW  4' MO 4' JLW 4' 4' 4' 4'                Neuro Re-Ed             Cane ext/IR             Posture tband black  30 ea Black 30 ea black  30 ea  Black  x30ea black  30 ea black x30ea Black x30ea Black  x30ea Black x30ea   Prone flex/h ab/ext 1#  30 ea 1#  30 ea 1#  30 ea  x20ea 20 ea 1#x20ea 1# x30ea 1#x30  ea 1#x30ea   IR stretch with strap 30"x3 30"x3 30"x3  30"x4 30"x4 30" x3 30"x3 30"x3 30"x3   Diagonals with tband D2 flex  D1 ext  green  15 ea D2 flex  D1 ext  green  15 ea D2 flex  D1 ext  green  20 ea       NV   H ab with tband blue  20 Blue  20x blue  20     GTBx20 GTB  x20 GTB x20   B ER with tband blue  20 Blue  20x blue  20     GTBx20 GTB  x20 GTB x20                Ther Ex             UBE (f/b)  4'/4' 4'/4' 4'/4'  4'/4' 3'/3' 3'/3' 3'/3' 3'/3' 3'/3'   Sleeper stretch             Supine flexion 3# x30 3# x30 3# x30  3#x20 3# x20 3#x30 3#x30 3#x30 3# x30   SL serratus punch 3# x30 3# x30 3# x30  3#x20 3# x20 3#x30 3#x30 3#x30 3#x30   SL abduction 3# x30 3# x30 3# x30  3#x 3# x20 3#x30 3#x30 3#x30 3#x30   Push up plus on wall                                       Ther Activity             Dolphin lift 2 5# x20 2 5# x20 NV       2# x20   Squigz 2 5#  18 squigz on and off 2 5#  18 squigz on and off NV       2#x20   Bodyblade

## 2021-06-30 ENCOUNTER — OFFICE VISIT (OUTPATIENT)
Dept: PHYSICAL THERAPY | Facility: CLINIC | Age: 51
End: 2021-06-30
Payer: COMMERCIAL

## 2021-06-30 DIAGNOSIS — M25.511 ACUTE PAIN OF RIGHT SHOULDER: Primary | ICD-10-CM

## 2021-06-30 PROCEDURE — 97112 NEUROMUSCULAR REEDUCATION: CPT | Performed by: PHYSICAL THERAPIST

## 2021-06-30 PROCEDURE — 97110 THERAPEUTIC EXERCISES: CPT | Performed by: PHYSICAL THERAPIST

## 2021-06-30 PROCEDURE — 97530 THERAPEUTIC ACTIVITIES: CPT | Performed by: PHYSICAL THERAPIST

## 2021-06-30 PROCEDURE — 97140 MANUAL THERAPY 1/> REGIONS: CPT | Performed by: PHYSICAL THERAPIST

## 2021-06-30 NOTE — PROGRESS NOTES
Daily Note     Today's date: 2021  Patient name: Sridhar Beasley  : 1970  MRN: [id number]  Referring provider: Kayli Ibrahim DO  Dx:   Encounter Diagnosis     ICD-10-CM    1  Acute pain of right shoulder  M25 511                   Subjective: Pt reports no new changes  Objective: See treatment diary below      Assessment: Pt had good tolerance to all activities  Full ROM with PROM (pain at end range)  Plan: Continue per plan of care        Precautions: none    Manuals    PROM to rebecca 8' JLW 8' JM 8' JLW 8'     8' 8'   Joint mob AP NP        KT MD   Laser to R biceps insertion 4' JLW 4' JM 4' JLW 4'     4' 4'                Neuro Re-Ed             Ab Holster ext/IR             Posture tband black  30 ea Black 30 ea black  30 ea      Black  x30ea Black x30ea   Prone flex/h ab/ext 1#  30 ea 1#  30 ea 1#  30 ea 1# 30 ea     1#x30  ea 1#x30ea   IR stretch with strap 30"x3 30"x3 30"x3 30"x3     30"x3 30"x3   Diagonals with tband D2 flex  D1 ext  green  15 ea D2 flex  D1 ext  green  15 ea D2 flex  D1 ext  green  20 ea Blue x20      NV   H ab with tband blue  20 Blue  20x blue  20 Blue x20     GTB  x20 GTB x20   B ER with tband blue  20 Blue  20x blue  20 blue x20     GTB  x20 GTB x20                Ther Ex             UBE (f/b)  4'/4' 4'/4' 4'/4' 4'/4'     3'/3' 3'/3'   Sleeper stretch             Supine flexion 3# x30 3# x30 3# x30 3#x30     3#x30 3# x30   SL serratus punch 3# x30 3# x30 3# x30 3#x30     3#x30 3#x30   SL abduction 3# x30 3# x30 3# x30 3#x30     3#x30 3#x30   Push up plus on wall                                       Ther Activity             Dolphin lift 2 5# x20 2 5# x20 NV 2 5# x20      2# x20   Squigz 2 5#  18 squigz on and off 2 5#  18 squigz on and off NV 2 5#x 20      2#x20   Bodyblade

## 2021-07-07 ENCOUNTER — OFFICE VISIT (OUTPATIENT)
Dept: PHYSICAL THERAPY | Facility: CLINIC | Age: 51
End: 2021-07-07
Payer: COMMERCIAL

## 2021-07-07 DIAGNOSIS — M25.511 ACUTE PAIN OF RIGHT SHOULDER: Primary | ICD-10-CM

## 2021-07-07 PROCEDURE — 97112 NEUROMUSCULAR REEDUCATION: CPT | Performed by: PHYSICAL THERAPIST

## 2021-07-07 PROCEDURE — 97110 THERAPEUTIC EXERCISES: CPT | Performed by: PHYSICAL THERAPIST

## 2021-07-07 PROCEDURE — 97140 MANUAL THERAPY 1/> REGIONS: CPT | Performed by: PHYSICAL THERAPIST

## 2021-07-07 PROCEDURE — 97530 THERAPEUTIC ACTIVITIES: CPT | Performed by: PHYSICAL THERAPIST

## 2021-07-07 NOTE — PROGRESS NOTES
Daily Note     Today's date: 2021  Patient name: Kai Colvin  : 1970  MRN: [id number]  Referring provider: Bhavik Hutton DO  Dx:   Encounter Diagnosis     ICD-10-CM    1  Acute pain of right shoulder  M25 511                   Subjective: Pt reports no sig changes since his LV and that his shoulder is feeling "pretty good"  Objective: See treatment diary below      Assessment: good tolerance to manual tx with improved rom noted post-tx  No reports t/o therex but reports shoulder fatigue post-tx  Plan: Continue per plan of care        Precautions: none    Manuals    PROM to rebecca 8' JLW 8' JM 8' JLW 8' 8'    8' 8'   Joint mob AP NP        KT MD   Laser to R biceps insertion 4' JLW 4' JM 4' JLW 4' 4'    4' 4'                Neuro Re-Ed             Cane ext/IR             Posture tband black  30 ea Black 30 ea black  30 ea      Black  x30ea Black x30ea   Prone flex/h ab/ext 1#  30 ea 1#  30 ea 1#  30 ea 1# 30 ea 1#x30    1#x30  ea 1#x30ea   IR stretch with strap 30"x3 30"x3 30"x3 30"x3 30"x3    30"x3 30"x3   Diagonals with tband D2 flex  D1 ext  green  15 ea D2 flex  D1 ext  green  15 ea D2 flex  D1 ext  green  20 ea Blue x20 Blue x20     NV   H ab with tband blue  20 Blue  20x blue  20 Blue x20 Blue x20    GTB  x20 GTB x20   B ER with tband blue  20 Blue  20x blue  20 blue x20 Blue x20    GTB  x20 GTB x20                Ther Ex             UBE (f/b)  4'/4' 4'/4' 4'/4' 4'/4' 4'/4'    3'/3' 3'/3'   Sleeper stretch             Supine flexion 3# x30 3# x30 3# x30 3#x30 3#x30    3#x30 3# x30   SL serratus punch 3# x30 3# x30 3# x30 3#x30 3#x30    3#x30 3#x30   SL abduction 3# x30 3# x30 3# x30 3#x30 3#x30    3#x30 3#x30   Push up plus on wall                                       Ther Activity             Dolphin lift 2 5# x20 2 5# x20 NV 2 5# x20 2 5#x20     2# x20   Squigz 2 5#  18 squigz on and off 2 5#  18 squigz on and off NV 2 5#x 20 2 5#x20     2#x20 Bodyblade

## 2021-07-14 ENCOUNTER — EVALUATION (OUTPATIENT)
Dept: PHYSICAL THERAPY | Facility: CLINIC | Age: 51
End: 2021-07-14
Payer: COMMERCIAL

## 2021-07-14 DIAGNOSIS — M25.511 ACUTE PAIN OF RIGHT SHOULDER: Primary | ICD-10-CM

## 2021-07-14 PROCEDURE — 97112 NEUROMUSCULAR REEDUCATION: CPT

## 2021-07-14 PROCEDURE — 97110 THERAPEUTIC EXERCISES: CPT

## 2021-07-14 PROCEDURE — 97140 MANUAL THERAPY 1/> REGIONS: CPT

## 2021-07-14 NOTE — PROGRESS NOTES
Daily Note/DC Summary    Today's date: 2021  Patient name: Arelis Frank  : 1970  MRN: [id number]  Referring provider: Marta Mcelroy DO  Dx:   Encounter Diagnosis     ICD-10-CM    1  Acute pain of right shoulder  M25 511        Start Time: 1446  Stop Time: 1531  Total time in clinic (min): 45 minutes       Subjective: Patient reports his right shoulder is feeling 85-90% better in regards to pain and function  Patient reports his main limitation had been reaching behind his back however "now I can pretty much go all the way over "      Objective: See treatment diary below  Right shoulder ROM is WNLs  Assessment: Patient is doing well with therapeutic exercise program and is encouraged to remain compliant with independent HEP  Plan: Discharge from outpatient PT         Precautions: none    Manuals    PROM to rebecca 8' JLW 8' JM 8' JLW 8' 8' 5' JLW   8' 8'   Joint mob AP NP        KT MD   Laser to R biceps insertion 4' JLW 4' JM 4' JLW 4' 4' 4' JLW   4' 4'                Neuro Re-Ed             Cane ext/IR             Posture tband black  30 ea Black 30 ea black  30 ea      Black  x30ea Black x30ea   Prone flex/h ab/ext 1#  30 ea 1#  30 ea 1#  30 ea 1# 30 ea 1#x30 1# x30   1#x30  ea 1#x30ea   IR stretch with strap 30"x3 30"x3 30"x3 30"x3 30"x3 30"x3   30"x3 30"x3   Diagonals with tband D2 flex  D1 ext  green  15 ea D2 flex  D1 ext  green  15 ea D2 flex  D1 ext  green  20 ea Blue x20 Blue x20 D2 flex  D1 ext  blue  20 ea    NV   H ab with tband blue  20 Blue  20x blue  20 Blue x20 Blue x20 black  20   GTB  x20 GTB x20   B ER with tband blue  20 Blue  20x blue  20 blue x20 Blue x20 black  20   GTB  x20 GTB x20                Ther Ex             UBE (f/b)  4'/4' 4'/4' 4'/4' 4'/4' 4'/4' 4'/4'   3'/3' 3'/3'   Sleeper stretch             Supine flexion 3# x30 3# x30 3# x30 3#x30 3#x30 3# x30   3#x30 3# x30   SL serratus punch 3# x30 3# x30 3# x30 3#x30 3#x30 3# x30 3#x30 3#x30   SL abduction 3# x30 3# x30 3# x30 3#x30 3#x30 3# x30   3#x30 3#x30   Push up plus on wall                                       Ther Activity             Dolphin lift 2 5# x20 2 5# x20 NV 2 5# x20 2 5#x20 NP    2# x20   Squigz 2 5#  18 squigz on and off 2 5#  18 squigz on and off NV 2 5#x 20 2 5#x20 NP    2#x20   Bodyblade

## 2021-09-28 ENCOUNTER — PROCEDURE VISIT (OUTPATIENT)
Dept: UROLOGY | Facility: MEDICAL CENTER | Age: 51
End: 2021-09-28
Payer: COMMERCIAL

## 2021-09-28 ENCOUNTER — TELEPHONE (OUTPATIENT)
Dept: UROLOGY | Facility: MEDICAL CENTER | Age: 51
End: 2021-09-28

## 2021-09-28 VITALS
HEART RATE: 84 BPM | BODY MASS INDEX: 34.3 KG/M2 | HEIGHT: 71 IN | SYSTOLIC BLOOD PRESSURE: 120 MMHG | WEIGHT: 245 LBS | DIASTOLIC BLOOD PRESSURE: 80 MMHG

## 2021-09-28 DIAGNOSIS — E29.1 TESTICULAR HYPOFUNCTION: Primary | ICD-10-CM

## 2021-09-28 PROCEDURE — S0189 TESTOSTERONE PELLET 75 MG: HCPCS | Performed by: UROLOGY

## 2021-09-28 PROCEDURE — 11980 IMPLANT HORMONE PELLET(S): CPT | Performed by: UROLOGY

## 2021-09-28 RX ORDER — DULAGLUTIDE 3 MG/.5ML
INJECTION, SOLUTION SUBCUTANEOUS
COMMUNITY
Start: 2021-08-10

## 2021-09-28 NOTE — LETTER
September 28, 2021     Ce Brooks, [address] [place] [address]    Patient: Patricia Fuller   YOB: 1970   Date of Visit: 9/28/2021       Dear Dr Carl Morrow:    Thank you for referring Patricia Fuller to me for evaluation  Below are my notes for this consultation  If you have questions, please do not hesitate to call me  I look forward to following your patient along with you  Sincerely,        Maulik Razo MD        CC: No Recipients  Maulik Razo MD  9/28/2021  9:13 AM  Sign when Signing Visit  Procedures  Preoperative diagnosis:  Testicular hypogonadism    Postoperative diagnosis: Same    Operation:  Testopel implant    Surgeon:  Rob Orellana MD    Anesthesia:  Xylocaine 1% with epinephrine, 12 mL    Procedure: The patient was positioned on the procedure table with his right side down  His left buttock was then prepped and draped in sterile fashion  A total of 12 mL of 1% xylocaine was infiltrated at the pellet insertion site and along 3 subcutaneous tracts to receive the pellets  A small skin incision was made  The subcutaneous tracts were created in each was filled with 4 pellets  Following implantation, a dry sterile dressing was applied  The procedure was completed  The patient tolerated well and was sent home in satisfactory condition  Blood loss was minimal     The patient should be off from work today and tomorrow, September 29, 2021 returning to full work duties on September 30, 2021  The patient will be due for his annual digital rectal exam and PSA at the time of his next Testopel implant  The patient's PCP orders his PSAs

## 2021-09-28 NOTE — PROGRESS NOTES
Procedures  Preoperative diagnosis:  Testicular hypogonadism    Postoperative diagnosis: Same    Operation:  Testopel implant    Surgeon:  Ti Ybarra MD    Anesthesia:  Xylocaine 1% with epinephrine, 12 mL    Procedure: The patient was positioned on the procedure table with his right side down  His left buttock was then prepped and draped in sterile fashion  A total of 12 mL of 1% xylocaine was infiltrated at the pellet insertion site and along 3 subcutaneous tracts to receive the pellets  A small skin incision was made  The subcutaneous tracts were created in each was filled with 4 pellets  Following implantation, a dry sterile dressing was applied  The procedure was completed  The patient tolerated well and was sent home in satisfactory condition  Blood loss was minimal     The patient should be off from work today and tomorrow, September 29, 2021 returning to full work duties on September 30, 2021  The patient will be due for his annual digital rectal exam and PSA at the time of his next Testopel implant  The patient's PCP orders his PSAs

## 2022-02-03 ENCOUNTER — PROCEDURE VISIT (OUTPATIENT)
Dept: UROLOGY | Facility: MEDICAL CENTER | Age: 52
End: 2022-02-03
Payer: COMMERCIAL

## 2022-02-03 ENCOUNTER — TELEPHONE (OUTPATIENT)
Dept: UROLOGY | Facility: MEDICAL CENTER | Age: 52
End: 2022-02-03

## 2022-02-03 VITALS
BODY MASS INDEX: 32.34 KG/M2 | SYSTOLIC BLOOD PRESSURE: 120 MMHG | WEIGHT: 231 LBS | HEART RATE: 78 BPM | HEIGHT: 71 IN | DIASTOLIC BLOOD PRESSURE: 82 MMHG

## 2022-02-03 DIAGNOSIS — Z12.5 SCREENING FOR PROSTATE CANCER: ICD-10-CM

## 2022-02-03 DIAGNOSIS — E29.1 TESTICULAR HYPOFUNCTION: Primary | ICD-10-CM

## 2022-02-03 PROCEDURE — S0189 TESTOSTERONE PELLET 75 MG: HCPCS | Performed by: UROLOGY

## 2022-02-03 PROCEDURE — 99213 OFFICE O/P EST LOW 20 MIN: CPT | Performed by: UROLOGY

## 2022-02-03 PROCEDURE — 11980 IMPLANT HORMONE PELLET(S): CPT | Performed by: UROLOGY

## 2022-02-03 NOTE — PROGRESS NOTES
Subq hormone pellet implantation     Date/Time 2/3/2022 8:44 AM     Performed by  Gale Davila MD     Authorized by Gale Davila MD      Universal Protocol   Consent: Verbal consent obtained  Written consent obtained  Risks and benefits: risks, benefits and alternatives were discussed  Consent given by: patient  Time out: Immediately prior to procedure a "time out" was called to verify the correct patient, procedure, equipment, support staff and site/side marked as required  Patient understanding: patient states understanding of the procedure being performed  Patient consent: the patient's understanding of the procedure matches consent given  Procedure consent: procedure consent matches procedure scheduled  Relevant documents: relevant documents present and verified  Test results: test results available and properly labeled  Required items: required blood products, implants, devices, and special equipment available  Patient identity confirmed: verbally with patient        Local anesthesia used: yes      Anesthesia: local infiltration     Anesthesia   Local anesthesia used: yes  Local Anesthetic: lidocaine 1% with epinephrine  Anesthetic total: 10 mL     Sedation   Patient sedated: no        Specimen: no    Culture: no   Procedure Details   Procedure Notes: With the patient in the fetal position with the left side down i e  right lateral decubitus position the buttocks was prepped and draped usual sterile fashion  Lidocaine 1% with epinephrine was used to create a skin wheal in the right lateral buttocks I and then the needle was placed deeper in inverted V pattern anesthetizing the subcutaneous tissue  A 11 blade was used to make a stab incision at the apex of the inverted V and then the testosterone pellet trocar was placed down 1 arm of the V and 6 pellets were placed in the subcutaneous plane    In a similar fashion the trocar was placed down the other arm of the V and another 6 pellets placed  The trocar was removed pressure was held to control bleeding and sterile dressings were then applied over the stab incision  The patient tolerated the procedure well without difficulty  Prior to the procedure the patient and I discussed the procedure in detail with the patient understanding risks of extrusion infection need for additional operative procedures  I reviewed his laboratory work including testosterone level PSA CBC chemistry profile and LH level drawn in January of 2022  NEISHA was also performed revealing a 25 g a nodular nontender prostate  The patient will undergo further blood work as ordered and return in 6 months for repeat test pellet insertion  There were no complications  The patient recovered uneventfully

## 2022-08-11 ENCOUNTER — PROCEDURE VISIT (OUTPATIENT)
Dept: UROLOGY | Facility: MEDICAL CENTER | Age: 52
End: 2022-08-11
Payer: COMMERCIAL

## 2022-08-11 VITALS
SYSTOLIC BLOOD PRESSURE: 110 MMHG | WEIGHT: 238 LBS | DIASTOLIC BLOOD PRESSURE: 70 MMHG | HEART RATE: 90 BPM | OXYGEN SATURATION: 95 % | BODY MASS INDEX: 33.32 KG/M2 | HEIGHT: 71 IN

## 2022-08-11 DIAGNOSIS — E29.1 TESTICULAR HYPOFUNCTION: Primary | ICD-10-CM

## 2022-08-11 DIAGNOSIS — Z12.5 SCREENING FOR PROSTATE CANCER: ICD-10-CM

## 2022-08-11 PROCEDURE — 99213 OFFICE O/P EST LOW 20 MIN: CPT | Performed by: UROLOGY

## 2022-08-11 PROCEDURE — 11980 IMPLANT HORMONE PELLET(S): CPT | Performed by: UROLOGY

## 2022-08-11 PROCEDURE — S0189 TESTOSTERONE PELLET 75 MG: HCPCS | Performed by: UROLOGY

## 2022-08-11 RX ORDER — VARENICLINE TARTRATE 1 MG/1
TABLET, FILM COATED ORAL
COMMUNITY
Start: 2022-06-06

## 2022-08-11 RX ORDER — ROSUVASTATIN CALCIUM 10 MG/1
TABLET, COATED ORAL
COMMUNITY
Start: 2022-08-11

## 2022-08-11 NOTE — PROGRESS NOTES
Assessment/Plan:   1  Testicular hypogonadism-12 testosterone pellets administered today without difficulty  Patient's peak testosterone within range however kaia is sub normal   Will move frequency of pellets to 3 times a year i e  Q 4 months  Discussed possible transition to oral testosterone which is now available  Patient will contact me prior to next pellet administration as to whether he wants to continue with testosterone pellets or start oral medication  Prostate cancer screening NEISHA and PSA within acceptable limits  No problem-specific Assessment & Plan notes found for this encounter  Diagnoses and all orders for this visit:    Testicular hypofunction    Screening for prostate cancer    Other orders  -     varenicline (CHANTIX) 1 mg tablet  -     rosuvastatin (CRESTOR) 10 MG tablet  -     Cholecalciferol 25 MCG (1000 UT) tablet; Take 2,000 Units by mouth 2 (two) times a day  -     B Complex Vitamins (B COMPLEX 100 PO); Take 1 capsule by mouth 2 (two) times a day  -     Multiple Vitamin (MULTIVITAMIN ADULT PO); Take by mouth 2 (two) times a day          Subjective:      Patient ID: Alexus Shukla is a 46 y o  male  HPI  42-year-old male with hypogonadism presents for testosterone pellet administration  The patient has no side effects and no complaints concerning this  We discussed his blood work  The following portions of the patient's history were reviewed and updated as appropriate: allergies, current medications, past family history, past medical history, past social history, past surgical history and problem list     Review of Systems   All other systems reviewed and are negative  Objective:      /70   Pulse 90   Ht 5' 11" (1 803 m)   Wt 108 kg (238 lb)   SpO2 95%   BMI 33 19 kg/m²          Physical Exam  Vitals reviewed  Constitutional:       General: He is not in acute distress  Appearance: Normal appearance   He is not ill-appearing, toxic-appearing or diaphoretic  HENT:      Head: Normocephalic and atraumatic  Mouth/Throat:      Mouth: Mucous membranes are moist    Eyes:      Extraocular Movements: Extraocular movements intact  Pulmonary:      Effort: Pulmonary effort is normal  No respiratory distress  Abdominal:      General: Bowel sounds are normal  There is no distension  Palpations: Abdomen is soft  Genitourinary:     Comments: NEISHA normal anal verge normal anal sphincter tone no palpable rectal masses  Prostate 25 g a nodular nontender  Neurological:      Mental Status: He is alert and oriented to person, place, and time  Psychiatric:         Mood and Affect: Mood normal          Behavior: Behavior normal          Thought Content: Thought content normal          Judgment: Judgment normal                Subq hormone pellet implantation     Date/Time 8/11/2022 3:11 PM     Performed by  Aly Reynoso MD     Authorized by Aly Reynoso MD      Universal Protocol   Consent: Verbal consent obtained  Written consent obtained  Risks and benefits: risks, benefits and alternatives were discussed  Consent given by: patient  Patient understanding: patient states understanding of the procedure being performed  Patient consent: the patient's understanding of the procedure matches consent given  Procedure consent: procedure consent matches procedure scheduled  Required items: required blood products, implants, devices, and special equipment available  Patient identity confirmed: verbally with patient        Local anesthesia used: yes      Anesthesia: local infiltration     Anesthesia   Local anesthesia used: yes  Local Anesthetic: lidocaine 2% with epinephrine  Anesthetic total: 12 mL     Sedation   Patient sedated: no        Specimen: no    Culture: no   Procedure Details   Procedure Notes:  With the patient in the right lateral decubitus position with the left buttocks up the buttocks was prepped with Betadine and anesthetized in an inverted V fashion including the skin at the apex of the V and the subcutaneous tissues in each arm of the V utilizing lidocaine 2% with epinephrine  An 11 blade was used to make a stab incision at the apex of the inverted V and a trocar was placed down each arm of the inverted V to deliver 6 testosterone pellets on either side for a total of 12 pellets  The trocar was removed there was no bleeding sterile dressings were applied the patient tolerated the procedure well without difficulty

## 2022-08-11 NOTE — LETTER
August 11, 2022     Anam Schmitt, [address] [facility] [address]    Patient: Karishma White   YOB: 1970   Date of Visit: 8/11/2022       Dear Dr Leopold Offer:    Thank you for referring Karishma White to me for evaluation  Below are my notes for this consultation  If you have questions, please do not hesitate to call me  I look forward to following your patient along with you  Sincerely,        Tory Zafar MD        CC: No Recipients  Tory Zafar MD  8/11/2022  3:13 PM  Sign when Signing Visit  Assessment/Plan:   1  Testicular hypogonadism-12 testosterone pellets administered today without difficulty  Patient's peak testosterone within range however kaia is sub normal   Will move frequency of pellets to 3 times a year i e  Q 4 months  Discussed possible transition to oral testosterone which is now available  Patient will contact me prior to next pellet administration as to whether he wants to continue with testosterone pellets or start oral medication  Prostate cancer screening NEISHA and PSA within acceptable limits  No problem-specific Assessment & Plan notes found for this encounter  Diagnoses and all orders for this visit:    Testicular hypofunction    Screening for prostate cancer    Other orders  -     varenicline (CHANTIX) 1 mg tablet  -     rosuvastatin (CRESTOR) 10 MG tablet  -     Cholecalciferol 25 MCG (1000 UT) tablet; Take 2,000 Units by mouth 2 (two) times a day  -     B Complex Vitamins (B COMPLEX 100 PO); Take 1 capsule by mouth 2 (two) times a day  -     Multiple Vitamin (MULTIVITAMIN ADULT PO); Take by mouth 2 (two) times a day          Subjective:      Patient ID: Karishma White is a 46 y o  male  HPI  51-year-old male with hypogonadism presents for testosterone pellet administration  The patient has no side effects and no complaints concerning this  We discussed his blood work    The following portions of the patient's history were reviewed and updated as appropriate: allergies, current medications, past family history, past medical history, past social history, past surgical history and problem list     Review of Systems   All other systems reviewed and are negative  Objective:      /70   Pulse 90   Ht 5' 11" (1 803 m)   Wt 108 kg (238 lb)   SpO2 95%   BMI 33 19 kg/m²          Physical Exam  Vitals reviewed  Constitutional:       General: He is not in acute distress  Appearance: Normal appearance  He is not ill-appearing, toxic-appearing or diaphoretic  HENT:      Head: Normocephalic and atraumatic  Mouth/Throat:      Mouth: Mucous membranes are moist    Eyes:      Extraocular Movements: Extraocular movements intact  Pulmonary:      Effort: Pulmonary effort is normal  No respiratory distress  Abdominal:      General: Bowel sounds are normal  There is no distension  Palpations: Abdomen is soft  Genitourinary:     Comments: NEISHA normal anal verge normal anal sphincter tone no palpable rectal masses  Prostate 25 g a nodular nontender  Neurological:      Mental Status: He is alert and oriented to person, place, and time  Psychiatric:         Mood and Affect: Mood normal          Behavior: Behavior normal          Thought Content: Thought content normal          Judgment: Judgment normal                Subq hormone pellet implantation     Date/Time 8/11/2022 3:11 PM     Performed by  Geneva Bourne MD     Authorized by Geneva Bourne MD      Baker Protocol   Consent: Verbal consent obtained  Written consent obtained    Risks and benefits: risks, benefits and alternatives were discussed  Consent given by: patient  Patient understanding: patient states understanding of the procedure being performed  Patient consent: the patient's understanding of the procedure matches consent given  Procedure consent: procedure consent matches procedure scheduled  Required items: required blood products, implants, devices, and special equipment available  Patient identity confirmed: verbally with patient        Local anesthesia used: yes      Anesthesia: local infiltration     Anesthesia   Local anesthesia used: yes  Local Anesthetic: lidocaine 2% with epinephrine  Anesthetic total: 12 mL     Sedation   Patient sedated: no        Specimen: no    Culture: no   Procedure Details   Procedure Notes: With the patient in the right lateral decubitus position with the left buttocks up the buttocks was prepped with Betadine and anesthetized in an inverted V fashion including the skin at the apex of the V and the subcutaneous tissues in each arm of the V utilizing lidocaine 2% with epinephrine  An 11 blade was used to make a stab incision at the apex of the inverted V and a trocar was placed down each arm of the inverted V to deliver 6 testosterone pellets on either side for a total of 12 pellets  The trocar was removed there was no bleeding sterile dressings were applied the patient tolerated the procedure well without difficulty

## 2022-08-19 ENCOUNTER — TELEPHONE (OUTPATIENT)
Dept: UROLOGY | Facility: MEDICAL CENTER | Age: 52
End: 2022-08-19

## 2022-08-19 NOTE — TELEPHONE ENCOUNTER
----- Message from Alyssa Almanzar sent at 8/12/2022  7:03 AM EDT -----  Regarding: FW: Shannon Hahn    ----- Message -----  From: Connie Witt MA  Sent: 8/11/2022   3:37 PM EDT  To: Alyssa Almanzar, #  Subject: TESTOPEL                                         Patient has appt 12/15 for Testopel

## 2022-12-09 NOTE — TELEPHONE ENCOUNTER
12 Testopel pellets insertion with Dr Mir Edmondson 4/9/2019   ([facility] delivered pellets 3/7/19)    Cap BC active as of 2/8/19   No Auth required PHYSICAL THERAPY EVALUATION  NAME: Riley Bailey  AGE:   68 y o  MRN:  [id number]  ADMIT DX: Toe pain [M79 676]  Gangrene of toe ([facility] ) [I96]  Cellulitis of left foot [Y29 710]  [facility] ) [A48 0]    PMH:   Past Medical History:   Diagnosis Date   • Anxiety    • Arrhythmia    • Arthritis    • Atrial fibrillation (HCC)    • Bradycardia     has pacemaker   • Cancer ([facility] )     [address] [place] [address] One level  (1 [address])   [address] [place] [address] [id number]      Cody Swartz PT,DPT

## 2022-12-15 ENCOUNTER — TELEPHONE (OUTPATIENT)
Dept: UROLOGY | Facility: MEDICAL CENTER | Age: 52
End: 2022-12-15

## 2022-12-15 ENCOUNTER — PROCEDURE VISIT (OUTPATIENT)
Dept: UROLOGY | Facility: MEDICAL CENTER | Age: 52
End: 2022-12-15

## 2022-12-15 VITALS
HEART RATE: 91 BPM | SYSTOLIC BLOOD PRESSURE: 130 MMHG | WEIGHT: 238 LBS | DIASTOLIC BLOOD PRESSURE: 80 MMHG | BODY MASS INDEX: 33.32 KG/M2 | HEIGHT: 71 IN

## 2022-12-15 DIAGNOSIS — Z12.5 SCREENING FOR PROSTATE CANCER: ICD-10-CM

## 2022-12-15 DIAGNOSIS — E29.1 TESTICULAR HYPOFUNCTION: Primary | ICD-10-CM

## 2022-12-15 RX ORDER — BENZONATATE 200 MG/1
CAPSULE ORAL
COMMUNITY
Start: 2022-12-14

## 2022-12-15 NOTE — PROGRESS NOTES
Assessment/Plan:  #1  Testicular hypogonadism- patient's testosterone has been within normal range up to 5 days prior to this testopel ministration  Continue same dosage i e  12 pellets every 4 months  2   Prostate cancer screening- PSA due August 2023  No problem-specific Assessment & Plan notes found for this encounter  Diagnoses and all orders for this visit:    Testicular hypofunction  -     Testosterone, free, total; Future  -     CBC and Platelet; Future  -     Comprehensive metabolic panel; Future  -     PSA Total, Diagnostic; Future  -     Subq hormone pellet implantation    Screening for prostate cancer    Other orders  -     benzonatate (TESSALON) 200 MG capsule          Subjective:      Patient ID: Jamie Jiménez is a 46 y o  male  HPI  28-year-old male with hypogonadism presents for testosterone pellet administration  The patient has no side effects and no complaints concerning this  We discussed his blood work  The following portions of the patient's history were reviewed and updated as appropriate: allergies, current medications, past family history, past medical history, past social history, past surgical history and problem list     Review of Systems   All other systems reviewed and are negative  Objective:      /80   Pulse 91   Ht 5' 11" (1 803 m)   Wt 108 kg (238 lb)   BMI 33 19 kg/m²          Physical Exam  Vitals reviewed  Constitutional:       General: He is not in acute distress  Appearance: Normal appearance  He is not ill-appearing, toxic-appearing or diaphoretic  HENT:      Head: Normocephalic and atraumatic  Nose: Nose normal       Mouth/Throat:      Mouth: Mucous membranes are moist    Eyes:      Extraocular Movements: Extraocular movements intact  Pulmonary:      Effort: Pulmonary effort is normal  No respiratory distress  Abdominal:      General: There is no distension  Palpations: Abdomen is soft     Neurological:      Mental Status: He is alert and oriented to person, place, and time  Psychiatric:         Mood and Affect: Mood normal          Behavior: Behavior normal          Thought Content: Thought content normal          Judgment: Judgment normal                  Subq hormone pellet implantation     Date/Time 12/15/2022 8:46 AM     Performed by  Katie Fischer MD     Authorized by Katie Fischer MD      [place] Protocol   Consent: Verbal consent obtained  Written consent obtained  Risks and benefits: risks, benefits and alternatives were discussed  Consent given by: patient  Patient understanding: patient states understanding of the procedure being performed  Patient consent: the patient's understanding of the procedure matches consent given  Procedure consent: procedure consent matches procedure scheduled  Required items: required blood products, implants, devices, and special equipment available  Patient identity confirmed: verbally with patient        Local anesthesia used: yes      Anesthesia: local infiltration     Anesthesia   Local anesthesia used: yes  Local Anesthetic: lidocaine 1% with epinephrine  Anesthetic total: 10 mL     Sedation   Patient sedated: no        Specimen: no    Culture: no   Procedure Details   Procedure Notes: After prepping the right buttocks with the patient in the left lateral decubitus position knees to chest lidocaine 1% with epinephrine was injected in an inverted the pattern on the right buttocks with the apex of the inverted the being a intradermal injection and each arm of the inverted the being anesthetized in the subcutaneous plane  An 11 scalpel blade was used to make a stab incision at the apex of the inverted V and then the introducer trocar was placed up each arm of the inverted V into which 6 pellets each was placed in the subcutaneous plane  After 12 pellets were administered sterile dressings were applied    There was no bleeding and the patient tolerated the procedure well without complication

## 2023-05-04 ENCOUNTER — TELEPHONE (OUTPATIENT)
Dept: UROLOGY | Facility: MEDICAL CENTER | Age: 53
End: 2023-05-04

## 2023-05-04 ENCOUNTER — PROCEDURE VISIT (OUTPATIENT)
Dept: UROLOGY | Facility: MEDICAL CENTER | Age: 53
End: 2023-05-04

## 2023-05-04 VITALS
HEIGHT: 71 IN | HEART RATE: 84 BPM | DIASTOLIC BLOOD PRESSURE: 80 MMHG | SYSTOLIC BLOOD PRESSURE: 130 MMHG | BODY MASS INDEX: 32.48 KG/M2 | WEIGHT: 232 LBS | OXYGEN SATURATION: 99 %

## 2023-05-04 DIAGNOSIS — Z12.5 SCREENING FOR PROSTATE CANCER: ICD-10-CM

## 2023-05-04 DIAGNOSIS — E29.1 TESTICULAR HYPOFUNCTION: Primary | ICD-10-CM

## 2023-05-04 NOTE — PROGRESS NOTES
"Assessment/Plan:  #1  Testicular hypogonadism-testosterone levels within acceptable limits  Continue Testopel 12 pellets every 4 months    2  Prostate cancer screening/BPH without obstructive symptoms-AUA symptom score is 4  PSA remains well within normal limits and stable  NEISHA August 2023  No problem-specific Assessment & Plan notes found for this encounter  Diagnoses and all orders for this visit:    Testicular hypofunction    Screening for prostate cancer          Subjective:      Patient ID: Mikhail Tesfaye is a 48 y o  male  HPI 15-year-old male with testicular hypogonadism receiving Testopel  Patient notes improved sense of energy loss of fatigue and denies any side effects  The patient presents for review of associated labs  AUA symptom score is 4  The patient is voiding well and denies gross hematuria and dysuria  The following portions of the patient's history were reviewed and updated as appropriate: allergies, current medications, past family history, past medical history, past social history, past surgical history and problem list     Review of Systems   All other systems reviewed and are negative  Objective:      /80 (BP Location: Left arm, Patient Position: Sitting, Cuff Size: Large)   Pulse 84   Ht 5' 11\" (1 803 m)   Wt 105 kg (232 lb)   SpO2 99%   BMI 32 36 kg/m²          Physical Exam  Vitals reviewed  Constitutional:       General: He is not in acute distress  Appearance: Normal appearance  He is not ill-appearing, toxic-appearing or diaphoretic  HENT:      Head: Normocephalic and atraumatic  Nose: Nose normal       Mouth/Throat:      Mouth: Mucous membranes are moist    Eyes:      Extraocular Movements: Extraocular movements intact  Pulmonary:      Effort: Pulmonary effort is normal  No respiratory distress  Abdominal:      General: There is no distension  Palpations: Abdomen is soft     Neurological:      Mental Status: He is alert and " oriented to person, place, and time  Psychiatric:         Mood and Affect: Mood normal          Behavior: Behavior normal          Thought Content: Thought content normal          Judgment: Judgment normal                    Subq hormone pellet implantation     Date/Time 5/4/2023 8:30 AM     Performed by  Sendy Roach MD     Authorized by Sendy Roach MD      Universal Protocol   Consent: Verbal consent obtained  Written consent obtained  Risks and benefits: risks, benefits and alternatives were discussed  Patient understanding: patient states understanding of the procedure being performed  Patient consent: the patient's understanding of the procedure matches consent given  Procedure consent: procedure consent matches procedure scheduled  Required items: required blood products, implants, devices, and special equipment available  Patient identity confirmed: verbally with patient        Local anesthesia used: yes      Anesthesia: local infiltration     Anesthesia   Local anesthesia used: yes  Local Anesthetic: lidocaine 2% without epinephrine and bupivacaine 0 5% without epinephrine  Anesthetic total: 10 mL     Sedation   Patient sedated: no        Specimen: no    Culture: no   Procedure Details   Procedure Notes: With the patient in the left flank up position needs to chest the left buttocks was prepped and draped in usual fashion  In an inverted V fashion combination of lidocaine 2% without epinephrine and bupivacaine 0 5% without epinephrine was used to anesthetize the skin at the level of the vertex of the V and then in the subcu plane up each arm of the inverted V   11 scalpel blade was used to make a stab incision at the vertex of the inverted V and the Testopel trocar was inserted up each arm of the inverted V into which 6 pellets were placed in each pass for a total of 12 pellets  Sterile dressings were applied    There was no significant bleeding and the patient tolerated the procedure well in good condition    He will return in 4 months for repeat Testopel placement

## 2023-05-04 NOTE — TELEPHONE ENCOUNTER
Noted on Excel spreadsheet for DOS 9/21/23  TESTOPEL 75mg - [employer] - NO authorization required  Office stock okay to use   *VLD 5/4/23

## 2023-05-04 NOTE — LETTER
"May 4, 2023     Myron Mccall, [address] [place] [address]    Patient: Juliana Isaac   YOB: 1970   Date of Visit: 5/4/2023       Dear Dr Gal Adame:    Thank you for referring Julaina Isaac to me for evaluation  Below are my notes for this consultation  If you have questions, please do not hesitate to call me  I look forward to following your patient along with you  Sincerely,        Jabier Bustillo MD        CC: No Recipients  Jabier Bustillo MD  5/4/2023  8:59 AM  Sign when Signing Visit  Assessment/Plan:  #1  Testicular hypogonadism-testosterone levels within acceptable limits  Continue Testopel 12 pellets every 4 months    2  Prostate cancer screening/BPH without obstructive symptoms-AUA symptom score is 4  PSA remains well within normal limits and stable  NEISHA August 2023  No problem-specific Assessment & Plan notes found for this encounter  Diagnoses and all orders for this visit:    Testicular hypofunction    Screening for prostate cancer         Subjective:     Patient ID: Juliana Isaac is a 48 y o  male  HPI 49-year-old male with testicular hypogonadism receiving Testopel  Patient notes improved sense of energy loss of fatigue and denies any side effects  The patient presents for review of associated labs  AUA symptom score is 4  The patient is voiding well and denies gross hematuria and dysuria  The following portions of the patient's history were reviewed and updated as appropriate: allergies, current medications, past family history, past medical history, past social history, past surgical history and problem list     Review of Systems   All other systems reviewed and are negative  Objective:      /80 (BP Location: Left arm, Patient Position: Sitting, Cuff Size: Large)   Pulse 84   Ht 5' 11\" (1 803 m)   Wt 105 kg (232 lb)   SpO2 99%   BMI 32 36 kg/m²         Physical Exam  Vitals reviewed     Constitutional:       General: He " is not in acute distress  Appearance: Normal appearance  He is not ill-appearing, toxic-appearing or diaphoretic  HENT:      Head: Normocephalic and atraumatic  Nose: Nose normal       Mouth/Throat:      Mouth: Mucous membranes are moist    Eyes:      Extraocular Movements: Extraocular movements intact  Pulmonary:      Effort: Pulmonary effort is normal  No respiratory distress  Abdominal:      General: There is no distension  Palpations: Abdomen is soft  Neurological:      Mental Status: He is alert and oriented to person, place, and time  Psychiatric:         Mood and Affect: Mood normal          Behavior: Behavior normal          Thought Content: Thought content normal          Judgment: Judgment normal                   Subq hormone pellet implantation     Date/Time 5/4/2023 8:30 AM     Performed by  Pelon Spain MD     Authorized by Pelon Spain MD      [place] Protocol   Consent: Verbal consent obtained  Written consent obtained  Risks and benefits: risks, benefits and alternatives were discussed  Patient understanding: patient states understanding of the procedure being performed  Patient consent: the patient's understanding of the procedure matches consent given  Procedure consent: procedure consent matches procedure scheduled  Required items: required blood products, implants, devices, and special equipment available  Patient identity confirmed: verbally with patient        Local anesthesia used: yes      Anesthesia: local infiltration     Anesthesia   Local anesthesia used: yes  Local Anesthetic: lidocaine 2% without epinephrine and bupivacaine 0 5% without epinephrine  Anesthetic total: 10 mL     Sedation   Patient sedated: no        Specimen: no    Culture: no   Procedure Details   Procedure Notes: With the patient in the left flank up position needs to chest the left buttocks was prepped and draped in usual fashion    In an inverted V fashion combination of lidocaine 2% without epinephrine and bupivacaine 0 5% without epinephrine was used to anesthetize the skin at the level of the vertex of the V and then in the subcu plane up each arm of the inverted V   11 scalpel blade was used to make a stab incision at the vertex of the inverted V and the Testopel trocar was inserted up each arm of the inverted V into which 6 pellets were placed in each pass for a total of 12 pellets  Sterile dressings were applied  There was no significant bleeding and the patient tolerated the procedure well in good condition    He will return in 4 months for repeat Testopel placement

## 2023-09-16 ENCOUNTER — RA CDI HCC (OUTPATIENT)
Dept: OTHER | Facility: HOSPITAL | Age: 53
End: 2023-09-16

## 2023-09-16 NOTE — PROGRESS NOTES
[address] [facility] coding opportunities       Chart reviewed, no opportunity found: CHART REVIEWED, NO OPPORTUNITY FOUND        Patients Insurance        Commercial Insurance: Amador Diaz

## 2023-09-21 ENCOUNTER — TELEPHONE (OUTPATIENT)
Dept: UROLOGY | Facility: MEDICAL CENTER | Age: 53
End: 2023-09-21

## 2023-09-21 ENCOUNTER — PROCEDURE VISIT (OUTPATIENT)
Dept: UROLOGY | Facility: MEDICAL CENTER | Age: 53
End: 2023-09-21
Payer: COMMERCIAL

## 2023-09-21 VITALS
SYSTOLIC BLOOD PRESSURE: 120 MMHG | HEIGHT: 71 IN | WEIGHT: 224 LBS | DIASTOLIC BLOOD PRESSURE: 70 MMHG | HEART RATE: 96 BPM | BODY MASS INDEX: 31.36 KG/M2

## 2023-09-21 DIAGNOSIS — E29.1 TESTICULAR HYPOFUNCTION: Primary | ICD-10-CM

## 2023-09-21 PROCEDURE — 11980 IMPLANT HORMONE PELLET(S): CPT | Performed by: UROLOGY

## 2023-09-21 PROCEDURE — S0189 TESTOSTERONE PELLET 75 MG: HCPCS | Performed by: UROLOGY

## 2023-09-21 RX ORDER — DULAGLUTIDE 4.5 MG/.5ML
INJECTION, SOLUTION SUBCUTANEOUS
COMMUNITY
Start: 2023-08-16

## 2023-09-21 NOTE — LETTER
September 21, 2023     [facility], [address] [place] [address]    Patient: Zaina Roberts   YOB: 1970   Date of Visit: 9/21/2023       Dear Dr. Karime Cline:    Thank you for referring Zaina Roberts to me for evaluation. Below are my notes for this consultation. If you have questions, please do not hesitate to call me. I look forward to following your patient along with you. Sincerely,        Dane Rojas MD        CC: No Recipients    Dane Rojas MD  9/21/2023  8:29 AM  Sign when Signing Visit        Subq hormone pellet implantation     Date/Time 9/21/2023 8:30 AM     Performed by  Dane Rojas MD   Authorized by Dane Rojas MD     [place] Protocol   Consent: Verbal consent obtained. Written consent obtained. Risks and benefits: risks, benefits and alternatives were discussed  Consent given by: patient  Patient understanding: patient states understanding of the procedure being performed  Patient consent: the patient's understanding of the procedure matches consent given  Procedure consent: procedure consent matches procedure scheduled  Required items: required blood products, implants, devices, and special equipment available  Patient identity confirmed: verbally with patient        Local anesthesia used: yes      Anesthesia: local infiltration     Anesthesia   Local anesthesia used: yes  Local Anesthetic: lidocaine 1% with epinephrine  Anesthetic total: 10 mL     Sedation   Patient sedated: no        Specimen: no    Culture: no   Procedure Details   Procedure Notes: With the patient in left lateral decubitus position with knees to chest right buttocks up the right buttocks was prepped and draped in the usual sterile fashion.   The anesthetic mixture as noted above was injected into the dermis and then the subcutaneous tissue in an inverted V fashion with the dermal injection being at the apex of the V.  Using 11 scalpel blade a stab incision was made at the apex of the inverted V and then the delivery trocar was placed down each arm of the V delivering 6 pellets down each arm a total of 12 pellets. There is no bleeding or complication. The trocars were removed and sterile dressings were applied. The patient will return again in 4 months for Testopel administration prior to which time repeat blood work as ordered will take place. The patient will contact us if he has any ill effects from the present administration. The patient had no complications and recovered uneventfully leaving the office in good condition.   Patient tolerance: patient tolerated the procedure well with no immediate complications

## 2024-01-23 ENCOUNTER — TELEPHONE (OUTPATIENT)
Dept: UROLOGY | Facility: MEDICAL CENTER | Age: 54
End: 2024-01-23

## 2024-01-23 NOTE — TELEPHONE ENCOUNTER
Spoke to patient in regards to testings prior to Procedure. Patient stated he had them done at [facility]. Testing on [facility], done 01/13/24.

## 2024-01-25 ENCOUNTER — PROCEDURE VISIT (OUTPATIENT)
Dept: UROLOGY | Facility: MEDICAL CENTER | Age: 54
End: 2024-01-25
Payer: COMMERCIAL

## 2024-01-25 VITALS
OXYGEN SATURATION: 98 % | WEIGHT: 213 LBS | HEART RATE: 85 BPM | SYSTOLIC BLOOD PRESSURE: 120 MMHG | DIASTOLIC BLOOD PRESSURE: 74 MMHG | HEIGHT: 70 IN | BODY MASS INDEX: 30.49 KG/M2

## 2024-01-25 DIAGNOSIS — E29.1 TESTICULAR HYPOFUNCTION: Primary | ICD-10-CM

## 2024-01-25 PROCEDURE — S0189 TESTOSTERONE PELLET 75 MG: HCPCS | Performed by: UROLOGY

## 2024-01-25 PROCEDURE — 11980 IMPLANT HORMONE PELLET(S): CPT | Performed by: UROLOGY

## 2024-01-25 RX ORDER — GABAPENTIN 300 MG/1
300 CAPSULE ORAL
COMMUNITY
Start: 2023-12-05 | End: 2024-02-03

## 2024-01-25 RX ORDER — DAPAGLIFLOZIN AND METFORMIN HYDROCHLORIDE 10; 1000 MG/1; MG/1
TABLET, FILM COATED, EXTENDED RELEASE ORAL
COMMUNITY
Start: 2023-12-19

## 2024-01-25 NOTE — LETTER
"January 25, 2024     Andre Hadley DO  [address] [facility] [address]    Patient: Fran Jauregui   YOB: 1970   Date of Visit: 1/25/2024       Dear Dr. Hadley:    Thank you for referring Fran Jauregui to me for evaluation. Below are my notes for this consultation.    If you have questions, please do not hesitate to call me. I look forward to following your patient along with you.         Sincerely,        Rocael Leos MD        CC: No Recipients    Rocael Leos MD  1/25/2024 10:15 AM  Sign when Signing Visit      Subq hormone pellet implantation     Date/Time  1/25/2024 10:00 AM     Performed by  Rocael Leos MD   Authorized by  Rocael Leos MD     [place] Protocol   Consent: Verbal consent obtained. Written consent obtained.  Risks and benefits: risks, benefits and alternatives were discussed  Consent given by: patient  Time out: Immediately prior to procedure a \"time out\" was called to verify the correct patient, procedure, equipment, support staff and site/side marked as required.  Patient understanding: patient states understanding of the procedure being performed  Patient consent: the patient's understanding of the procedure matches consent given  Procedure consent: procedure consent matches procedure scheduled  Required items: required blood products, implants, devices, and special equipment available  Patient identity confirmed: verbally with patient      Local anesthesia used: yes      Anesthesia: local infiltration     Anesthesia   Local anesthesia used: yes  Local Anesthetic: lidocaine 1% with epinephrine  Anesthetic total: 10 mL     Sedation   Patient sedated: no        Specimen: no    Culture: no   Procedure Details   Procedure Notes:  With the patient in right lateral decubitus position with knees to chest with left buttocks up, the left buttocks was prepped and draped in the usual sterile fashion.  The anesthetic mixture as noted above was injected into " the dermis and then the subcutaneous tissue in an inverted V fashion with the dermal injection being at the apex of the V.  Using 11 scalpel blade a stab incision was made at the apex of the inverted V and then the delivery trocar was placed down each arm of the V delivering 6 pellets down each arm a total of 12 pellets.  There is no bleeding or complication.  The trocars were removed and sterile dressings were applied.  The patient will return again in 4 months for Testopel administration prior to which time repeat blood work as ordered will take place.  The patient will contact us if he has any ill effects from the present administration.  The patient had no complications and recovered uneventfully leaving the office in good condition.  Patient tolerance: patient tolerated the procedure well with no immediate complications        Patient tolerance: patient tolerated the procedure well with no immediate complications

## 2024-01-25 NOTE — PROGRESS NOTES
"    Subq hormone pellet implantation     Date/Time  1/25/2024 10:00 AM     Performed by  Rocael Leos MD   Authorized by  Rocael Leos MD     [place] Protocol   Consent: Verbal consent obtained. Written consent obtained.  Risks and benefits: risks, benefits and alternatives were discussed  Consent given by: patient  Time out: Immediately prior to procedure a \"time out\" was called to verify the correct patient, procedure, equipment, support staff and site/side marked as required.  Patient understanding: patient states understanding of the procedure being performed  Patient consent: the patient's understanding of the procedure matches consent given  Procedure consent: procedure consent matches procedure scheduled  Required items: required blood products, implants, devices, and special equipment available  Patient identity confirmed: verbally with patient      Local anesthesia used: yes      Anesthesia: local infiltration     Anesthesia   Local anesthesia used: yes  Local Anesthetic: lidocaine 1% with epinephrine  Anesthetic total: 10 mL     Sedation   Patient sedated: no        Specimen: no    Culture: no   Procedure Details   Procedure Notes:  With the patient in right lateral decubitus position with knees to chest with left buttocks up, the left buttocks was prepped and draped in the usual sterile fashion.  The anesthetic mixture as noted above was injected into the dermis and then the subcutaneous tissue in an inverted V fashion with the dermal injection being at the apex of the V.  Using 11 scalpel blade a stab incision was made at the apex of the inverted V and then the delivery trocar was placed down each arm of the V delivering 6 pellets down each arm a total of 12 pellets.  There is no bleeding or complication.  The trocars were removed and sterile dressings were applied.  The patient will return again in 4 months for Testopel administration prior to which time repeat blood work as ordered will " take place.  The patient will contact us if he has any ill effects from the present administration.  The patient had no complications and recovered uneventfully leaving the office in good condition.  Patient tolerance: patient tolerated the procedure well with no immediate complications        Patient tolerance: patient tolerated the procedure well with no immediate complications

## 2024-05-02 NOTE — PROGRESS NOTES
Problem: Safety - Adult  Goal: Free from fall injury  Outcome: Progressing     Problem: Chronic Conditions and Co-morbidities  Goal: Patient's chronic conditions and co-morbidity symptoms are monitored and maintained or improved  Outcome: Progressing  Flowsheets (Taken 5/2/2024 0815)  Care Plan - Patient's Chronic Conditions and Co-Morbidity Symptoms are Monitored and Maintained or Improved: Monitor and assess patient's chronic conditions and comorbid symptoms for stability, deterioration, or improvement     Problem: Discharge Planning  Goal: Discharge to home or other facility with appropriate resources  Outcome: Progressing  Flowsheets (Taken 5/2/2024 0815)  Discharge to home or other facility with appropriate resources: Identify barriers to discharge with patient and caregiver     Problem: Skin/Tissue Integrity  Goal: Absence of new skin breakdown  Description: 1.  Monitor for areas of redness and/or skin breakdown  2.  Assess vascular access sites hourly  3.  Every 4-6 hours minimum:  Change oxygen saturation probe site  4.  Every 4-6 hours:  If on nasal continuous positive airway pressure, respiratory therapy assess nares and determine need for appliance change or resting period.  Outcome: Progressing     Problem: ABCDS Injury Assessment  Goal: Absence of physical injury  Outcome: Progressing     Problem: Pain  Goal: Verbalizes/displays adequate comfort level or baseline comfort level  Outcome: Progressing      Daily Note     Today's date: 2021  Patient name: Roslyn Holstein  : 1970  MRN: [id number]  Referring provider: Arcelia Arellano DO  Dx:   Encounter Diagnosis     ICD-10-CM    1  Acute pain of right shoulder  M25 511                   Subjective: "I'm going for an MRI "      Objective: See treatment diary below      Assessment: Pt reports that he was loosening a bolt and it broke loose and he yanked his shoulder  Pt has been having increased pain levels since  Pt able to complete program without complaints  Plan: Continue per plan of care        Precautions: none       Manuals       PROM to rebecca 8' 8' 8' 8' kl 8' MD      Joint mob AP Eulalia omalley np MD      Laser to R biceps insertion  4' 4' 4' kl 4' 4'                   Neuro Re-Ed             Theola Peeling ext/IR 10x10"  ea            Posture tband Blue  x20ea BTB x20ea BTB x20ea BTB  x20ea Black tb x30 Black  x30ea   Black x30ea      Prone flex/h ab/ext x15ea x15ea x20ea x20ea Black tb x30 Black  x30ea x20ea      IR stretch with strap  3x30" 3x30" 3x30" 3x30" 3x30" 3x30"                                             Ther Ex             UBE (f/b) 3'/3' 3'/3' 3'/3' 3'/3' 3'/3' 3'/3' 3'/3'      Pulleys (flex/ab) 10x10"            Sleeper stretch             Supine cane (flex/ab/ER) 10x10"  ea Red flex/ER 10x10" Red flex/ER 10x10" Red  Flex/ER  10x10" 10  "x10 10x10"  red 10x10" red      Supine flexion x20 1#x15 1#x20 2#x20 2#x30 2#x30 2#x20      SL serratus punch x20 1#x15 1#x20 2#x20 2#x30 2#x30 2#x20      SL abduction  1#x15 1#x20 2#x 2#x30 2#x30 2#x20      SL ER  1#x15 1#x20 2#x20 2#x30 2#x30 2#x20      Push up plus on wall x15                                                   Ther Activity

## 2024-05-23 ENCOUNTER — TELEPHONE (OUTPATIENT)
Dept: UROLOGY | Facility: MEDICAL CENTER | Age: 54
End: 2024-05-23

## 2024-05-25 LAB
ERYTHROCYTE [DISTWIDTH] IN BLOOD BY AUTOMATED COUNT: 12.8 % (ref 12–16)
HCT VFR BLD AUTO: 45.8 % (ref 37–48)
HGB BLD-MCNC: 15.4 G/DL (ref 12.5–17)
MCH RBC QN AUTO: 29 PG (ref 27–36)
MCHC RBC AUTO-ENTMCNC: 33.7 G/DL (ref 32–37)
MCV RBC AUTO: 86 FL (ref 80–100)
PLATELET # BLD AUTO: 193 THOU/CMM (ref 140–350)
PMV BLD REES-ECKER: 8.9 FL (ref 7.5–11.3)
PSA SERPL-MCNC: 1.24 NG/ML
RBC # BLD AUTO: 5.32 MILL/CMM (ref 4–5.4)
TESTOST SERPL-MCNC: 313 NG/DL (ref 150–684)
WBC # BLD AUTO: 8.9 THOU/CMM (ref 4–10.5)

## 2024-05-30 ENCOUNTER — PROCEDURE VISIT (OUTPATIENT)
Dept: UROLOGY | Facility: MEDICAL CENTER | Age: 54
End: 2024-05-30
Payer: COMMERCIAL

## 2024-05-30 ENCOUNTER — TELEPHONE (OUTPATIENT)
Dept: UROLOGY | Facility: MEDICAL CENTER | Age: 54
End: 2024-05-30

## 2024-05-30 VITALS
HEART RATE: 104 BPM | WEIGHT: 224 LBS | HEIGHT: 70 IN | BODY MASS INDEX: 32.07 KG/M2 | OXYGEN SATURATION: 98 % | DIASTOLIC BLOOD PRESSURE: 70 MMHG | SYSTOLIC BLOOD PRESSURE: 120 MMHG

## 2024-05-30 DIAGNOSIS — E29.1 TESTICULAR HYPOFUNCTION: Primary | ICD-10-CM

## 2024-05-30 PROCEDURE — 11980 IMPLANT HORMONE PELLET(S): CPT | Performed by: UROLOGY

## 2024-05-30 PROCEDURE — S0189 TESTOSTERONE PELLET 75 MG: HCPCS | Performed by: UROLOGY

## 2024-05-30 NOTE — PROGRESS NOTES
Subq hormone pellet implantation     Date/Time  5/30/2024 8:30 AM     Performed by  Rocael Leos MD   Authorized by  Rocael Leos MD     [place] Protocol   Consent: Verbal consent obtained. Written consent obtained.  Risks and benefits: risks, benefits and alternatives were discussed  Consent given by: patient  Patient understanding: patient states understanding of the procedure being performed  Patient consent: the patient's understanding of the procedure matches consent given  Procedure consent: procedure consent matches procedure scheduled  Required items: required blood products, implants, devices, and special equipment available  Patient identity confirmed: verbally with patient      Local anesthesia used: yes     Anesthesia   Local anesthesia used: yes  Local Anesthetic: lidocaine 2% with epinephrine  Anesthetic total: 10 mL     Sedation   Patient sedated: no        Specimen: no    Culture: no   Procedure Details   Procedure Notes:  With the patient in left lateral decubitus position with knees to chest with right buttocks up, the right  buttocks was prepped and draped in the usual sterile fashion.  The anesthetic mixture as noted above was injected into the dermis and then the subcutaneous tissue in an inverted V fashion with the dermal injection being at the apex of the V.  Using 11 scalpel blade a stab incision was made at the apex of the inverted V and then the delivery trocar was placed down each arm of the V delivering 6 pellets down each arm a total of 12 pellets.  There is no bleeding or complication.  The trocars were removed and sterile dressings were applied.  The patient will return again in 4 months for Testopel administration prior to which time repeat blood work as ordered will take place.  The patient will contact us if he has any ill effects from the present administration.  The patient had no complications and recovered uneventfully leaving the office in good condition.    I  reviewed the patient's most recent laboratory work which revealed a low normal testosterone level normal PSA and appropriate chemistry profile.  Testosterone level represents blood sample taken just prior to Testopel representing a point of kaia  Patient tolerance: patient tolerated the procedure well with no immediate complications

## 2024-05-30 NOTE — LETTER
May 30, 2024     Andre Hadley DO  [address] [facility] [id number]    Patient: Fran Jauregui   YOB: 1970   Date of Visit: 5/30/2024       Dear Dr. Hadley:    Thank you for referring Fran Jauregui to me for evaluation. Below are my notes for this consultation.    If you have questions, please do not hesitate to call me. I look forward to following your patient along with you.         Sincerely,        Rocael Leos MD        CC: No Recipients    Rocael Leos MD  5/30/2024  8:33 AM  Sign when Signing Visit      Subq hormone pellet implantation     Date/Time  5/30/2024 8:30 AM     Performed by  Rocael Leos MD   Authorized by  Rocael Leos MD     [place] Protocol   Consent: Verbal consent obtained. Written consent obtained.  Risks and benefits: risks, benefits and alternatives were discussed  Consent given by: patient  Patient understanding: patient states understanding of the procedure being performed  Patient consent: the patient's understanding of the procedure matches consent given  Procedure consent: procedure consent matches procedure scheduled  Required items: required blood products, implants, devices, and special equipment available  Patient identity confirmed: verbally with patient      Local anesthesia used: yes     Anesthesia   Local anesthesia used: yes  Local Anesthetic: lidocaine 2% with epinephrine  Anesthetic total: 10 mL     Sedation   Patient sedated: no        Specimen: no    Culture: no   Procedure Details   Procedure Notes:  With the patient in left lateral decubitus position with knees to chest with right buttocks up, the right  buttocks was prepped and draped in the usual sterile fashion.  The anesthetic mixture as noted above was injected into the dermis and then the subcutaneous tissue in an inverted V fashion with the dermal injection being at the apex of the V.  Using 11 scalpel blade a stab incision was made at the apex of the inverted V and  then the delivery trocar was placed down each arm of the V delivering 6 pellets down each arm a total of 12 pellets.  There is no bleeding or complication.  The trocars were removed and sterile dressings were applied.  The patient will return again in 4 months for Testopel administration prior to which time repeat blood work as ordered will take place.  The patient will contact us if he has any ill effects from the present administration.  The patient had no complications and recovered uneventfully leaving the office in good condition.    I reviewed the patient's most recent laboratory work which revealed a low normal testosterone level normal PSA and appropriate chemistry profile.  Testosterone level represents blood sample taken just prior to Testopel representing a point of kaia  Patient tolerance: patient tolerated the procedure well with no immediate complications

## 2024-08-07 DIAGNOSIS — E29.1 TESTICULAR HYPOFUNCTION: Primary | ICD-10-CM

## 2024-09-24 ENCOUNTER — RA CDI HCC (OUTPATIENT)
Dept: OTHER | Facility: HOSPITAL | Age: 54
End: 2024-09-24

## 2024-09-25 ENCOUNTER — TELEPHONE (OUTPATIENT)
Dept: UROLOGY | Facility: MEDICAL CENTER | Age: 54
End: 2024-09-25

## 2024-09-25 LAB
SHBG SERPL-SCNC: 32.6 NMOL/L
TESTOST FREE MFR SERPL: 2 % (ref 1.5–3.2)
TESTOST FREE SERPL-MCNC: 63.8 PG/ML (ref 21–135)
TESTOST SERPL-MCNC: 320 NG/DL (ref 150–684)
TESTOSTERONE.FREE+WB MFR SERPL: 46.7 %
TESTOSTERONE.FREE+WB SERPL-MCNC: 150 NG/DL (ref 48–317)

## 2024-10-03 ENCOUNTER — PROCEDURE VISIT (OUTPATIENT)
Dept: UROLOGY | Facility: MEDICAL CENTER | Age: 54
End: 2024-10-03
Payer: COMMERCIAL

## 2024-10-03 VITALS
OXYGEN SATURATION: 97 % | HEART RATE: 97 BPM | HEIGHT: 70 IN | SYSTOLIC BLOOD PRESSURE: 120 MMHG | DIASTOLIC BLOOD PRESSURE: 76 MMHG | BODY MASS INDEX: 32.93 KG/M2 | WEIGHT: 230 LBS

## 2024-10-03 DIAGNOSIS — E29.1 TESTICULAR HYPOFUNCTION: Primary | ICD-10-CM

## 2024-10-03 PROCEDURE — S0189 TESTOSTERONE PELLET 75 MG: HCPCS | Performed by: UROLOGY

## 2024-10-03 PROCEDURE — 11980 IMPLANT HORMONE PELLET(S): CPT | Performed by: UROLOGY

## 2024-10-03 PROCEDURE — 99213 OFFICE O/P EST LOW 20 MIN: CPT | Performed by: UROLOGY

## 2024-10-03 NOTE — LETTER
October 3, 2024     Andre Hadley DO  [address] [facility] [id number]    Patient: Fran Jauregui   YOB: 1970   Date of Visit: 10/3/2024       Dear Dr. Hadley:    Thank you for referring Fran Jauregui to me for evaluation. Below are my notes for this consultation.    If you have questions, please do not hesitate to call me. I look forward to following your patient along with you.         Sincerely,        Rocael Leos MD        CC: No Recipients    Rocael Leos MD  10/3/2024  8:54 AM  Signed      Subq hormone pellet implantation       Date/Time  10/3/2024 8:45 AM      Performed by  Rocael Leos MD   Authorized by  Rocael Leos MD     [place] Protocol    Consent: Verbal consent obtained. Written consent obtained.  Risks and benefits: risks, benefits and alternatives were discussed  Consent given by: patient  Patient understanding: patient states understanding of the procedure being performed  Patient consent: the patient's understanding of the procedure matches consent given  Procedure consent: procedure consent matches procedure scheduled  Required items: required blood products, implants, devices, and special equipment available  Patient identity confirmed: verbally with patient        Local anesthesia used: yes      Anesthesia    Local anesthesia used: yes  Local Anesthetic: lidocaine 2% with epinephrine  Anesthetic total: 10 mL      Sedation    Patient sedated: no           Specimen: no     Culture: no   Procedure Details    Procedure Notes:  With the patient in right lateral decubitus position with knees to chest with left buttocks up, the left buttocks was prepped and draped in the usual sterile fashion.  The anesthetic mixture as noted above was injected into the dermis and then the subcutaneous tissue in an inverted V fashion with the dermal injection being at the apex of the V.  Using 11 scalpel blade a stab incision was made at the apex of the inverted V and  then the delivery trocar was placed down each arm of the V delivering 6 pellets down each arm a total of 12 pellets.  There is no bleeding or complication.  The trocars were removed and sterile dressings were applied.  The patient will return again in 4 months for Testopel administration prior to which time repeat blood work as ordered will take place.  The patient will contact us if he has any ill effects from the present administration.  The patient had no complications and recovered uneventfully leaving the office in good condition.     I reviewed the patient's most recent laboratory work which revealed a normal testosterone level hypertriglyceridemia and appropriate chemistry profile with very mild elevation in hematocrit Testosterone level represents blood sample taken just prior to Testopel representing a point of kaia  Patient tolerance: patient tolerated the procedure well with no immediate complications

## 2024-10-03 NOTE — PROGRESS NOTES
Subq hormone pellet implantation       Date/Time  10/3/2024 8:45 AM      Performed by  Rocael Leos MD   Authorized by  Rocael Leos MD     [place] Protocol    Consent: Verbal consent obtained. Written consent obtained.  Risks and benefits: risks, benefits and alternatives were discussed  Consent given by: patient  Patient understanding: patient states understanding of the procedure being performed  Patient consent: the patient's understanding of the procedure matches consent given  Procedure consent: procedure consent matches procedure scheduled  Required items: required blood products, implants, devices, and special equipment available  Patient identity confirmed: verbally with patient        Local anesthesia used: yes      Anesthesia    Local anesthesia used: yes  Local Anesthetic: lidocaine 2% with epinephrine  Anesthetic total: 10 mL      Sedation    Patient sedated: no           Specimen: no     Culture: no   Procedure Details    Procedure Notes:  With the patient in right lateral decubitus position with knees to chest with left buttocks up, the left buttocks was prepped and draped in the usual sterile fashion.  The anesthetic mixture as noted above was injected into the dermis and then the subcutaneous tissue in an inverted V fashion with the dermal injection being at the apex of the V.  Using 11 scalpel blade a stab incision was made at the apex of the inverted V and then the delivery trocar was placed down each arm of the V delivering 6 pellets down each arm a total of 12 pellets.  There is no bleeding or complication.  The trocars were removed and sterile dressings were applied.  The patient will return again in 4 months for Testopel administration prior to which time repeat blood work as ordered will take place.  The patient will contact us if he has any ill effects from the present administration.  The patient had no complications and recovered uneventfully leaving the office in good  condition.     I reviewed the patient's most recent laboratory work which revealed a normal testosterone level hypertriglyceridemia and appropriate chemistry profile with very mild elevation in hematocrit Testosterone level represents blood sample taken just prior to Testopel representing a point of kaia  Patient tolerance: patient tolerated the procedure well with no immediate complications

## 2025-02-11 ENCOUNTER — PROCEDURE VISIT (OUTPATIENT)
Dept: UROLOGY | Facility: MEDICAL CENTER | Age: 55
End: 2025-02-11
Payer: COMMERCIAL

## 2025-02-11 VITALS
BODY MASS INDEX: 33.07 KG/M2 | HEART RATE: 87 BPM | WEIGHT: 231 LBS | OXYGEN SATURATION: 97 % | SYSTOLIC BLOOD PRESSURE: 120 MMHG | HEIGHT: 70 IN | DIASTOLIC BLOOD PRESSURE: 70 MMHG

## 2025-02-11 DIAGNOSIS — E29.1 TESTICULAR HYPOFUNCTION: Primary | ICD-10-CM

## 2025-02-11 DIAGNOSIS — Z12.5 SCREENING FOR PROSTATE CANCER: ICD-10-CM

## 2025-02-11 PROCEDURE — 11980 IMPLANT HORMONE PELLET(S): CPT | Performed by: UROLOGY

## 2025-02-11 PROCEDURE — S0189 TESTOSTERONE PELLET 75 MG: HCPCS | Performed by: UROLOGY

## 2025-02-11 NOTE — PATIENT INSTRUCTIONS
"Verbal instructions provided  Patient Education     Androgen replacement in men   The Basics   Written by the doctors and editors at [facility]   What are androgens? -- Androgens are a type of hormone the body makes naturally. Hormones are chemical messengers that turn different body processes on and off. Androgens are often called \"male hormones.\" That's because they are the main hormones that make males different from females.  People often think that only men have androgens and only women have female hormones (called \"estrogens\"). But the fact is, men and women both have androgens and estrogens. The difference is that men normally have much higher levels of androgens than women. And women normally have much higher levels of estrogens than men.  Why might I need androgen replacement? -- You might need androgen replacement if you have low levels of an androgen called \"testosterone.\" There are a few different androgens, but testosterone is the main one.  Symptoms of low testosterone can include:   Feeling tired, especially at the end of the day   Having little or no interest in sex - Doctors call this \"low libido.\"   Not being able to get or keep an erection when you have sex - Doctors call this \"erectile dysfunction.\"   Feeling depressed  Are there tests that can tell if I need androgen replacement? -- Yes. If you have symptoms of low testosterone, the doctor or nurse will do an exam and learn about your symptoms. They will also order a blood test to check your testosterone level. The test should be done in the morning, before 10:00, to get the most accurate results. If the results show that you have low testosterone, the test will be repeated to confirm this. Depending on your situation, you might also have other tests.   It's important to talk to your doctor if you think you might need androgen replacement. That way you can get tested to see if low testosterone is really the cause of your symptoms. (There are other " problems that can cause symptoms like feeling tired and losing interest in sex.)  What are the benefits of androgen replacement in men? -- The benefits include:   Improved interest in sex   Improved energy   Being less depressed or irritable  Does androgen replacement cause side effects? -- Some men who have androgen replacement with testosterone have side effects. These can include:   High amounts of red blood cells, especially in men who take testosterone shots    Acne   Blood clots (but this is rare)   A possible increase in the risk of heart attack or stroke - Experts are worried that testosterone treatments might increase the risk of heart attacks and strokes in some men. This is not likely to be a problem for men who are known to have low testosterone and are getting treatment to bring the level up to normal. But doctors are careful about when to suggest testosterone, because it can cause problems in men whose levels are already normal.  If you are getting androgen replacement, you will need regular exams and blood tests. This is so your doctor can monitor your testosterone levels and blood cell counts and look for any side effects.  How is androgen replacement given? -- There are several different forms. Testosterone comes in different forms, including a shot, gel, or capsule. The table lists the names of different androgen replacement medicines (table 1).  If you use testosterone replacement gels, you must wash your hands after putting on the medicine. That's because testosterone can get on other people's skin. If this happens, it can cause harmful side effects, especially in children. Always put the gels on skin that is covered by clothes. This can help keep testosterone from getting on other people.  Should men over 60 have androgen replacement? -- That's not always clear. Low testosterone is not always treated, especially in men older than 60. That's because it's normal for testosterone to drop slightly  "as you age. In fact, normal aging causes some of the same changes that happen in men with low testosterone, such as less energy or less interest in sex.  Men older than 60 might have testosterone replacement if:   At least 2 or 3 blood tests show very low testosterone   They have symptoms of low testosterone that bother them, like feeling very tired and having a low sex drive   The symptoms are not caused by another disease or condition that doctors can treat   They have not had a heart attack or stroke  Older men who have testosterone replacement need regular screening tests for prostate cancer.  All topics are updated as new evidence becomes available and our peer review process is complete.  This topic retrieved from [employer] on: Feb 26, 2024.  Topic 41610 Version 9.0  Release: 32.2.4 - C32.56  © 2024 UpToDate, Inc. and/or its affiliates. All rights reserved.  table 1: Commonly prescribed forms of testosterone  How it is given  Sample brand names ([place])  Notes    Gel or liquid AndroGel, Fortesta, Testim, Vogelxo Apply the gel or liquid to your skin once a day. Depending on the type, the gel or liquid can go on your upper arm, shoulder, belly, thighs, or armpits.   Injection (shot) Depo-testosterone, Xyosted, Aveed Depo-testosterone and Aveed are given into the buttocks muscle.  Xyosted is given into the belly, under the skin.   Capsule taken by mouth Jatenzo, Kyzatrex, Tlando Might be taken once or twice a day.   Nose spray Natesto Nose spray is given 3 times a day.   Implants Testopel This medicine comes as tiny pellets that are implanted under the skin of the buttocks, belly wall, or thigh. This is done every 3 to 6 months.   Testosterone is one of a few different \"male hormones,\" or what doctors call \"androgens.\" Low testosterone can be treated with testosterone replacement. This table lists the different forms of testosterone replacement, and sample brand names in the [place]. \"Generics\" might also be available. " Your doctor or nurse can give you specific directions for the type of testosterone you use.  Graphic 19428 Version 11.0  Consumer Information Use and Disclaimer   Disclaimer: This generalized information is a limited summary of diagnosis, treatment, and/or medication information. It is not meant to be comprehensive and should be used as a tool to help the user understand and/or assess potential diagnostic and treatment options. It does NOT include all information about conditions, treatments, medications, side effects, or risks that may apply to a specific patient. It is not intended to be medical advice or a substitute for the medical advice, diagnosis, or treatment of a health care provider based on the health care provider's examination and assessment of a patient's specific and unique circumstances. Patients must speak with a health care provider for complete information about their health, medical questions, and treatment options, including any risks or benefits regarding use of medications. This information does not endorse any treatments or medications as safe, effective, or approved for treating a specific patient. UpToDate, Inc. and its affiliates disclaim any warranty or liability relating to this information or the use thereof.The use of this information is governed by the Terms of Use, available at https://www.woltersFNDuwer.com/en/know/clinical-effectiveness-terms. 2024© UpToDate, Inc. and its affiliates and/or licensors. All rights reserved.  Copyright   © 2024 UpToDate, Inc. and/or its affiliates. All rights reserved.

## 2025-02-11 NOTE — LETTER
February 11, 2025     Patient: Fran Jauregui  YOB: 1970  Date of Visit: 2/11/2025      To Whom it May Concern:    Fran Jauregui is under my professional care. Fran was seen in my office on 2/11/2025. Fran may return to work on February 12, 205 .    If you have any questions or concerns, please don't hesitate to call.         Sincerely,          iNlay Owens MD        CC: No Recipients

## 2025-02-20 NOTE — PROGRESS NOTES
Administered Shingrix vaccine IM to left deltoid. Pt tolerated well.      Daily Note     Today's date: 5/3/2021  Patient name: Natalia Tamez  : 1970  MRN: [id number]  Referring provider: Earnest Arrington DO  Dx:   Encounter Diagnosis     ICD-10-CM    1  Acute pain of right shoulder  M25 511                 Subjective: "Not too good today, and yesterday "      Objective: See treatment diary below      Assessment: Performed exercise program w/o increasing symptoms  Required vc'ing at times during same  Able to rebecca manual therapies  Tolerated treatment well  Patient would benefit from continued PT      Plan: Continue per plan of care        Precautions: none       Manuals 4/5 4/7 4/12 4/14 4/19 4/21 4/26 4/28 5/3    PROM to rebecca 8' 8' 8' 8' kl 8' MD JLW 8' 8' MO    Joint mob AP Vicky Leach np, MD, np, MD    Laser to R biceps insertion  4' 4' 4' kl 4' 4' JLW 4' 4' MO                 Neuro Re-Ed             [place]y [place] ext/IR 10x10"  ea       10x10"ea 10x10"    Posture tband Blue  x20ea BTB x20ea BTB x20ea BTB  x20ea Black tb x30 Black  x30ea   Black x30ea  Black  x30ea    Prone flex/h ab/ext x15ea x15ea x20ea x20ea Black tb x30 Black  x30ea x20ea x20ea x20ea    IR stretch with strap  3x30" 3x30" 3x30" 3x30" 3x30" 3x30" 4x30" 4x30"                                           Ther Ex             UBE (f/b) 3'/3' 3'/3' 3'/3' 3'/3' 3'/3' 3'/3' 3'/3' 4'/4' 4'/4'    Pulleys (flex/ab) 10x10"       np     Sleeper stretch             Supine cane (flex/ab/ER) 10x10"  ea Red flex/ER 10x10" Red flex/ER 10x10" Red  Flex/ER  10x10" 10  "x10 10x10"  red 10x10" red 10x10" Flex/ ER  red 10x10"  Flex/ER  Red    Supine flexion x20 1#x15 1#x20 2#x20 2#x30 2#x30 2#x20 2# x20 2#x20    SL serratus punch x20 1#x15 1#x20 2#x20 2#x30 2#x30 2#x20 2#x20 2#x20    SL abduction  1#x15 1#x20 2#x 2#x30 2#x30 2#x20 2#x20 2#x20    SL ER  1#x15 1#x20 2#x20 2#x30 2#x30 2#x20 2#x20 2#x20    Push up plus on wall x15       NP                                            Ther Activity

## 2025-02-28 ENCOUNTER — APPOINTMENT (OUTPATIENT)
Dept: URGENT CARE | Facility: CLINIC | Age: 55
End: 2025-02-28
Payer: OTHER MISCELLANEOUS

## 2025-02-28 PROCEDURE — 99283 EMERGENCY DEPT VISIT LOW MDM: CPT | Performed by: NURSE PRACTITIONER

## 2025-02-28 PROCEDURE — G0382 LEV 3 HOSP TYPE B ED VISIT: HCPCS | Performed by: NURSE PRACTITIONER

## 2025-03-03 ENCOUNTER — APPOINTMENT (OUTPATIENT)
Dept: URGENT CARE | Facility: CLINIC | Age: 55
End: 2025-03-03
Payer: OTHER MISCELLANEOUS

## 2025-03-03 PROCEDURE — 99213 OFFICE O/P EST LOW 20 MIN: CPT | Performed by: NURSE PRACTITIONER

## 2025-03-07 ENCOUNTER — OCCMED (OUTPATIENT)
Dept: URGENT CARE | Facility: CLINIC | Age: 55
End: 2025-03-07
Payer: OTHER MISCELLANEOUS

## 2025-03-07 DIAGNOSIS — S61.011D LACERATION OF RIGHT THUMB WITHOUT FOREIGN BODY WITHOUT DAMAGE TO NAIL, SUBSEQUENT ENCOUNTER: Primary | ICD-10-CM

## 2025-03-07 PROCEDURE — 99213 OFFICE O/P EST LOW 20 MIN: CPT

## 2025-05-20 LAB
BASOPHILS # BLD AUTO: 0.1 THOU/CMM (ref 0–0.1)
BASOPHILS NFR BLD AUTO: 1 %
DIFFERENTIAL METHOD BLD: ABNORMAL
EOSINOPHIL # BLD AUTO: 1.2 THOU/CMM (ref 0–0.5)
EOSINOPHIL NFR BLD AUTO: 13 %
ERYTHROCYTE [DISTWIDTH] IN BLOOD BY AUTOMATED COUNT: 13.2 % (ref 12–16)
HCT VFR BLD AUTO: 48.8 % (ref 37–48)
HGB BLD-MCNC: 16.7 G/DL (ref 12.5–17)
LYMPHOCYTES # BLD AUTO: 2.1 THOU/CMM (ref 1–3)
LYMPHOCYTES NFR BLD AUTO: 23 %
MCH RBC QN AUTO: 29.1 PG (ref 27–36)
MCHC RBC AUTO-ENTMCNC: 34.2 G/DL (ref 32–37)
MCV RBC AUTO: 85 FL (ref 80–100)
MONOCYTES # BLD AUTO: 0.6 THOU/CMM (ref 0.3–1)
MONOCYTES NFR BLD AUTO: 7 %
NEUTROPHILS # BLD AUTO: 5.2 THOU/CMM (ref 1.8–7.8)
NEUTROPHILS NFR BLD AUTO: 56 %
PLATELET # BLD AUTO: 212 THOU/CMM (ref 140–350)
PMV BLD REES-ECKER: 8.9 FL (ref 7.5–11.3)
PSA SERPL-MCNC: 1.41 NG/ML
RBC # BLD AUTO: 5.73 MILL/CMM (ref 4–5.4)
TESTOST SERPL-MCNC: 381 NG/DL (ref 150–684)
WBC # BLD AUTO: 9.1 THOU/CMM (ref 4–10.5)

## 2025-06-12 ENCOUNTER — PROCEDURE VISIT (OUTPATIENT)
Dept: UROLOGY | Facility: MEDICAL CENTER | Age: 55
End: 2025-06-12
Payer: COMMERCIAL

## 2025-06-12 VITALS
HEART RATE: 78 BPM | DIASTOLIC BLOOD PRESSURE: 80 MMHG | OXYGEN SATURATION: 97 % | HEIGHT: 70 IN | BODY MASS INDEX: 31.21 KG/M2 | WEIGHT: 218 LBS | SYSTOLIC BLOOD PRESSURE: 124 MMHG

## 2025-06-12 DIAGNOSIS — E29.1 TESTICULAR HYPOFUNCTION: Primary | ICD-10-CM

## 2025-06-12 PROCEDURE — S0189 TESTOSTERONE PELLET 75 MG: HCPCS | Performed by: UROLOGY

## 2025-06-12 PROCEDURE — 11980 IMPLANT HORMONE PELLET(S): CPT | Performed by: UROLOGY

## 2025-06-12 NOTE — PROGRESS NOTES
"    Subq hormone pellet implantation     Date/Time  6/12/2025 8:30 AM     Performed by  Rocael Leos MD   Authorized by  Rocael Leos MD     [place] Protocol   procedure performed by consultantConsent: Verbal consent obtained. Written consent obtained  Risks and benefits: risks, benefits and alternatives were discussed  Consent given by: patient  Time out: Immediately prior to procedure a \"time out\" was called to verify the correct patient, procedure, equipment, support staff and site/side marked as required.  Patient understanding: patient states understanding of the procedure being performed  Patient consent: the patient's understanding of the procedure matches consent given  Procedure consent: procedure consent matches procedure scheduled  Relevant documents: relevant documents present and verified  Required items: required blood products, implants, devices, and special equipment available  Patient identity confirmed: verbally with patient      Local anesthesia used: yes     Anesthesia   Local anesthesia used: yes  Local Anesthetic: lidocaine 2% with epinephrine  Anesthetic total: 10 mL     Sedation   Patient sedated: no        Specimen: no    Culture: no   Procedure Details   Procedure Notes:  With the patient in left lateral decubitus position with knees to chest with left buttocks up, the right buttocks was prepped and draped in the usual sterile fashion.  The anesthetic mixture as noted above was injected into the dermis and then the subcutaneous tissue in an inverted V fashion with the dermal injection being at the apex of the V.  Using 11 scalpel blade a stab incision was made at the apex of the inverted V and then the delivery trocar was placed down each arm of the V delivering 6 pellets down each arm a total of 12 pellets.  There is no bleeding or complication.  The trocars were removed and sterile dressings were applied.  The patient will return again in 4 months for Testopel administration " prior to which time repeat blood work as ordered will take place.  The patient will contact us if he has any ill effects from the present administration.  The patient had no complications and recovered uneventfully leaving the office in good condition.     I reviewed the patient's most recent laboratory work which revealed a normal testosterone level hypertriglyceridemia and appropriate chemistry profile with very mild elevation in hematocrit Testosterone level represents blood sample taken just prior to Testopel representing a point of kaia  Patient tolerance: patient tolerated the procedure well with no immediate complications

## 2025-06-12 NOTE — LETTER
"June 12, 2025     Andre Hadley DO  [address] [facility] [address]    Patient: Fran Jauregui   YOB: 1970   Date of Visit: 6/12/2025       Dear Dr. Andre Hadley DO:    Thank you for referring Fran Jauregui to me for evaluation. Below are my notes for this consultation.    If you have questions, please do not hesitate to call me. I look forward to following your patient along with you.         Sincerely,        Rocael Leos MD        CC: No Recipients    Rocael Leos MD  6/12/2025  9:00 AM  Sign when Signing Visit      Subq hormone pellet implantation     Date/Time  6/12/2025 8:30 AM     Performed by  Rocael Leos MD   Authorized by  Rocael Leos MD     [place] Protocol   procedure performed by consultantConsent: Verbal consent obtained. Written consent obtained  Risks and benefits: risks, benefits and alternatives were discussed  Consent given by: patient  Time out: Immediately prior to procedure a \"time out\" was called to verify the correct patient, procedure, equipment, support staff and site/side marked as required.  Patient understanding: patient states understanding of the procedure being performed  Patient consent: the patient's understanding of the procedure matches consent given  Procedure consent: procedure consent matches procedure scheduled  Relevant documents: relevant documents present and verified  Required items: required blood products, implants, devices, and special equipment available  Patient identity confirmed: verbally with patient      Local anesthesia used: yes     Anesthesia   Local anesthesia used: yes  Local Anesthetic: lidocaine 2% with epinephrine  Anesthetic total: 10 mL     Sedation   Patient sedated: no        Specimen: no    Culture: no   Procedure Details   Procedure Notes:  With the patient in left lateral decubitus position with knees to chest with left buttocks up, the right buttocks was prepped and draped in the usual sterile " fashion.  The anesthetic mixture as noted above was injected into the dermis and then the subcutaneous tissue in an inverted V fashion with the dermal injection being at the apex of the V.  Using 11 scalpel blade a stab incision was made at the apex of the inverted V and then the delivery trocar was placed down each arm of the V delivering 6 pellets down each arm a total of 12 pellets.  There is no bleeding or complication.  The trocars were removed and sterile dressings were applied.  The patient will return again in 4 months for Testopel administration prior to which time repeat blood work as ordered will take place.  The patient will contact us if he has any ill effects from the present administration.  The patient had no complications and recovered uneventfully leaving the office in good condition.     I reviewed the patient's most recent laboratory work which revealed a normal testosterone level hypertriglyceridemia and appropriate chemistry profile with very mild elevation in hematocrit Testosterone level represents blood sample taken just prior to Testopel representing a point of kaia  Patient tolerance: patient tolerated the procedure well with no immediate complications

## 2025-06-16 NOTE — PROGRESS NOTES
Daily Note     Today's date: 3/31/2021  Patient name: Dick Lerma  : 1970  MRN: [id number]  Referring provider: Theodora Rodriguez DO  Dx:   Encounter Diagnosis     ICD-10-CM    1  Acute pain of right shoulder  M25 511                   Subjective: Pt states "it's hard to tell" if his shoulder is feeling better  Objective: See treatment diary below      Assessment: Discomfort at times during exercise program  Able to rebecca PROM to R shoulder  Will monitor  Patient would benefit from continued PT      Plan: Continue per plan of care  Precautions: none      Manuals 2/24 3/3 3/4 3/10 3/15 3/17 3/22 3/24 3/29 3/31   PROM to rebecca  8' 8' 8'  8' 8' 8' 8' 8'   Joint mob AP   Gr II-III DL  Gr   III Gr III  KL GR III  KL GR III  MD GR III  MD GR III  MD                             Neuro Re-Ed             Cane ext/IR  10x10" ext 10x10" ext 10x10" 10' x10" 10x10"  ea 10x10"  ea 10x10"  ea 10x10"  ea 10x10"  ea   Posture tband  GTB x15ea GTB x20ea GTB  x20ea GTB x20 ea GTB  x20ea Blue  x15ea [place]  x20ea [place]  x20ea [place]  x20ea   Prone flex/h ab/ext        NV x10ea x10ea                                                       Ther Ex             UBE (f/b)  3'/3' 3'/3' 3'/3' 3'/3' 3'/3' 3'/3' 3'/3' 3'/3' 3'/3'   Wall slides (flex/ab) 10x10" reviewd           Pulleys (flex/ab)  10x10" 10x10" 10x10" 10" x10 10x10"  ea 10x10"  ea 10x10"  ea 10x10"  ea 10x10"  ea   Sleeper stretch 3x30" 3x30" 3x30"  3x30"         Supine cane (flex/ab/ER)  Flex 10x10" Flex 10x10" Flex  10x10" Flex 10" x10 Flex  10x10" 10x10"  ea 10x10"  ea 10x10"  ea 10x10"ea   Supine flexion    x15 x15 x15 x20 x20 x20 NP   SL serratus punch     x15 x15 x20  x20 NP   Push up plus on wall       NV x10 x10 x15   Shoulder ER   Red 15x Red  20x Red x20 Red  x20 Grn  x15 Grn  x20 held held   Shoulder IR   Grn 15x Grn  20x Grn 20x Grn  20x Grn  x20 Blue  x15   Blue  x20 [place]  x20                                          Ther Activity
No